# Patient Record
Sex: FEMALE | Race: WHITE | NOT HISPANIC OR LATINO
[De-identification: names, ages, dates, MRNs, and addresses within clinical notes are randomized per-mention and may not be internally consistent; named-entity substitution may affect disease eponyms.]

---

## 2023-04-26 PROBLEM — Z00.00 ENCOUNTER FOR PREVENTIVE HEALTH EXAMINATION: Status: ACTIVE | Noted: 2023-04-26

## 2023-04-27 ENCOUNTER — APPOINTMENT (OUTPATIENT)
Dept: ORTHOPEDIC SURGERY | Facility: CLINIC | Age: 83
End: 2023-04-27
Payer: MEDICARE

## 2023-04-27 VITALS
HEIGHT: 65 IN | SYSTOLIC BLOOD PRESSURE: 148 MMHG | BODY MASS INDEX: 28.66 KG/M2 | DIASTOLIC BLOOD PRESSURE: 75 MMHG | WEIGHT: 172 LBS

## 2023-04-27 DIAGNOSIS — Z78.9 OTHER SPECIFIED HEALTH STATUS: ICD-10-CM

## 2023-04-27 DIAGNOSIS — Z85.828 PERSONAL HISTORY OF OTHER MALIGNANT NEOPLASM OF SKIN: ICD-10-CM

## 2023-04-27 DIAGNOSIS — Z82.69 FAMILY HISTORY OF OTHER DISEASES OF THE MUSCULOSKELETAL SYSTEM AND CONNECTIVE TISSUE: ICD-10-CM

## 2023-04-27 DIAGNOSIS — M16.12 UNILATERAL PRIMARY OSTEOARTHRITIS, LEFT HIP: ICD-10-CM

## 2023-04-27 DIAGNOSIS — Z86.39 PERSONAL HISTORY OF OTHER ENDOCRINE, NUTRITIONAL AND METABOLIC DISEASE: ICD-10-CM

## 2023-04-27 DIAGNOSIS — Z87.39 PERSONAL HISTORY OF OTHER DISEASES OF THE MUSCULOSKELETAL SYSTEM AND CONNECTIVE TISSUE: ICD-10-CM

## 2023-04-27 DIAGNOSIS — Z80.3 FAMILY HISTORY OF MALIGNANT NEOPLASM OF BREAST: ICD-10-CM

## 2023-04-27 DIAGNOSIS — M25.551 PAIN IN RIGHT HIP: ICD-10-CM

## 2023-04-27 DIAGNOSIS — M25.562 PAIN IN LEFT KNEE: ICD-10-CM

## 2023-04-27 PROCEDURE — 73564 X-RAY EXAM KNEE 4 OR MORE: CPT | Mod: LT

## 2023-04-27 PROCEDURE — 99205 OFFICE O/P NEW HI 60 MIN: CPT

## 2023-04-27 PROCEDURE — 73502 X-RAY EXAM HIP UNI 2-3 VIEWS: CPT | Mod: LT

## 2023-04-27 RX ORDER — NYSTATIN 100000 1/G
100000 POWDER TOPICAL
Qty: 1 | Refills: 0 | Status: ACTIVE | COMMUNITY
Start: 2023-04-27 | End: 1900-01-01

## 2023-04-27 RX ORDER — LEVOTHYROXINE SODIUM 0.07 MG/1
75 TABLET ORAL
Refills: 0 | Status: ACTIVE | COMMUNITY

## 2023-04-27 RX ORDER — LOVASTATIN 20 MG/1
20 TABLET ORAL
Refills: 0 | Status: ACTIVE | COMMUNITY

## 2023-04-27 RX ORDER — MELOXICAM 15 MG/1
TABLET ORAL
Refills: 0 | Status: ACTIVE | COMMUNITY

## 2023-04-27 RX ORDER — LEVOTHYROXINE SODIUM 50 UG/1
50 CAPSULE ORAL
Refills: 0 | Status: ACTIVE | COMMUNITY

## 2023-04-27 NOTE — ADDENDUM
[FreeTextEntry1] : This note was authored by Slick Anderson working as a medical scribe for Dr. Aaron Coulter. The note was reviewed, edited, and revised by Dr. Aaron Coulter whom is in agreement with the exam findings, imaging findings, and treatment plan. 04/27/2023

## 2023-04-27 NOTE — PHYSICAL EXAM
[de-identified] : The patient appears well nourished  and in no apparent distress.  The patient is alert and oriented to person, place, and time.   Affect and mood appear normal. The head is normocephalic and atraumatic.  The eyes reveal normal sclera and extra ocular muscles are intact. The tongue is midline with no apparent lesions.  No respiratory distress noted.  Sensation grossly intact.		  [de-identified] : Exam of the left hip shows hip flexion of 60 degrees, hip external rotation of 15 degrees, hip internal rotation of 0 degrees. Exam of the left knee shows no pain with range of motion. \par 5/5 motor strength bilaterally distally. Sensation intact distally.		  [de-identified] : X-ray: AP view of the pelvis and 2 views of the left hip demonstrate bone on bone arthritis.	 \par X-ray: 4 views of the left knee demonstrate well preserved joint spaces except mild patellofemoral arthritis.

## 2023-04-27 NOTE — CONSULT LETTER
[Dear  ___] : Dear  [unfilled], [Consult Letter:] : I had the pleasure of evaluating your patient, [unfilled]. [Please see my note below.] : Please see my note below. [Consult Closing:] : Thank you very much for allowing me to participate in the care of this patient.  If you have any questions, please do not hesitate to contact me. [Sincerely,] : Sincerely, [FreeTextEntry2] : KATELYN WALLER MD\par  [FreeTextEntry3] : Aaron Coulter MD\par Chief of Joint Replacement\par [facility]placement \par [facility]\par \par

## 2023-04-27 NOTE — HISTORY OF PRESENT ILLNESS
[de-identified] : Ms. DAMION HOWELL is a 82 year old female presenting for evaluation of left hip and left knee pain, the hip being the worst of the two. Her hip pain is localized to the left groin and is worse with all weightbearing typically as well as all range of motion of the hip.  She has difficulty walking any distance, standing for long period time, and using stairs.  She has had no injections thus far in the hip.  She has tried physical therapy and anti-inflammatories without significant improvement.  Patient also has left knee pain generalized anteriorly and medially.  This pain is also worse with weightbearing activity.  In the past she has been treated conservatively with gel and cortisone injections.  Her most recent injection overall was gel shot 2 weeks ago without any relief.  Back in October 2022 she had a steroid shot which also only worked temporarily.  She is hopeful to discuss left total hip replacement.

## 2023-04-27 NOTE — DISCUSSION/SUMMARY
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 The patient also presents with left knee pain, however, this pain is likely referred pain coming from her hip. Her left knee range of motion was pain free on exam today and x-rays reveal well preserved left knee joint spaces. Based upon the patient's continued symptoms and failure to respond to conservative treatment, I have recommended a left total hip replacement for this patient. A long discussion took place with the patient describing what a total joint replacement is and what the procedure would entail. A hip model, similar to the implants that will be used during the operation, was utilized to demonstrate the implants. Choices of implant manufactures were discussed and reviewed. The ability to secure the implant utilizing cement or cementless (press fit) fixation was discussed. Anterior and posterior exposures were discussed. For this patient an anterior approach is appropriate. The patient agrees with the plan of care as well as the use of implants.\par \par The hospitalization and post-operative care and rehabilitation were also discussed. The use of perioperative antibiotics and DVT prophylaxis were discussed. The risk, benefits and alternatives to a surgical intervention were discussed at length with the patient. The patient was also advised of risks related to the medical comorbidities and elevated body mass index (BMI). A lengthy discussion took place to review the most common complications including but not limited to: deep vein thrombosis, pulmonary embolus, heart attack, stroke, infection, wound breakdown, numbness, damage to nerves, tendon, muscles, arteries or other blood vessels, death and other possible complications from anesthesia. The patient was told that we will take steps to minimize these risks by using sterile technique, antibiotics and DVT prophylaxis when appropriate and follow the patient postoperatively in the office setting to monitor progress. The possibility of recurrent pain, no improvement in pain and actual worsening of pain were also discussed with the patient.\par \par The discharge plan of care focused on the patient going home following surgery. The patient was encouraged to make the necessary arrangements to have someone stay with them when they are discharged home. Following discharge, a home care nurse will visit the patient. The home care nurse will open your home care case and request home physical therapy services. Home physical therapy will commence following discharge provided it is appropriate and covered by the health insurance benefit plan.\par \par The benefits of surgery were discussed with the patient including the potential for improving the patient's current clinical condition through operative intervention. Alternatives to surgical intervention including continued conservative management were also discussed in detail. All questions were answered to the satisfaction of the patient. The treatment plan of care, as well as a model of a total hip equivalent to the one that will be used for their total joint replacement, was shared with the patient. The patient participated and agreed to the plan of care as well as the use of the recommended implants for their total hip replacement surgery.\par \par We are planning for robotic assistance for the total hip replacement. We discussed that this will require placement of iliac crest pins in the contralateral iliac crest for pelvic bone registration to allow for robotic guidance for the surgery. We will utilize robotic assistance to improve accuracy of the implants, and accurate restoration of both leg lengths and femoral offset.	\par \par The patient will use Nystatin powder under her abdominal crease leading up to surgery to ensure good skin quality near and around the operative site.

## 2023-04-28 ENCOUNTER — APPOINTMENT (OUTPATIENT)
Dept: CT IMAGING | Facility: CLINIC | Age: 83
End: 2023-04-28
Payer: MEDICARE

## 2023-04-28 PROCEDURE — 73700 CT LOWER EXTREMITY W/O DYE: CPT | Mod: LT

## 2023-05-11 ENCOUNTER — OUTPATIENT (OUTPATIENT)
Dept: OUTPATIENT SERVICES | Facility: HOSPITAL | Age: 83
LOS: 1 days | End: 2023-05-11
Payer: MEDICARE

## 2023-05-11 VITALS
OXYGEN SATURATION: 95 % | HEART RATE: 62 BPM | RESPIRATION RATE: 16 BRPM | TEMPERATURE: 98 F | DIASTOLIC BLOOD PRESSURE: 80 MMHG | SYSTOLIC BLOOD PRESSURE: 140 MMHG | WEIGHT: 175.93 LBS | HEIGHT: 64 IN

## 2023-05-11 DIAGNOSIS — M16.11 UNILATERAL PRIMARY OSTEOARTHRITIS, RIGHT HIP: ICD-10-CM

## 2023-05-11 DIAGNOSIS — E03.9 HYPOTHYROIDISM, UNSPECIFIED: ICD-10-CM

## 2023-05-11 DIAGNOSIS — Z01.818 ENCOUNTER FOR OTHER PREPROCEDURAL EXAMINATION: ICD-10-CM

## 2023-05-11 DIAGNOSIS — Z29.9 ENCOUNTER FOR PROPHYLACTIC MEASURES, UNSPECIFIED: ICD-10-CM

## 2023-05-11 DIAGNOSIS — M19.90 UNSPECIFIED OSTEOARTHRITIS, UNSPECIFIED SITE: ICD-10-CM

## 2023-05-11 LAB
A1C WITH ESTIMATED AVERAGE GLUCOSE RESULT: 5.3 % — SIGNIFICANT CHANGE UP (ref 4–5.6)
ANION GAP SERPL CALC-SCNC: 13 MMOL/L — SIGNIFICANT CHANGE UP (ref 5–17)
APTT BLD: 26.4 SEC — LOW (ref 27.5–35.5)
BASOPHILS # BLD AUTO: 0.05 K/UL — SIGNIFICANT CHANGE UP (ref 0–0.2)
BASOPHILS NFR BLD AUTO: 0.9 % — SIGNIFICANT CHANGE UP (ref 0–2)
BLD GP AB SCN SERPL QL: SIGNIFICANT CHANGE UP
BUN SERPL-MCNC: 12.9 MG/DL — SIGNIFICANT CHANGE UP (ref 8–20)
CALCIUM SERPL-MCNC: 9.3 MG/DL — SIGNIFICANT CHANGE UP (ref 8.4–10.5)
CHLORIDE SERPL-SCNC: 103 MMOL/L — SIGNIFICANT CHANGE UP (ref 96–108)
CO2 SERPL-SCNC: 22 MMOL/L — SIGNIFICANT CHANGE UP (ref 22–29)
CREAT SERPL-MCNC: 0.61 MG/DL — SIGNIFICANT CHANGE UP (ref 0.5–1.3)
EGFR: 88 ML/MIN/1.73M2 — SIGNIFICANT CHANGE UP
EOSINOPHIL # BLD AUTO: 0.17 K/UL — SIGNIFICANT CHANGE UP (ref 0–0.5)
EOSINOPHIL NFR BLD AUTO: 3 % — SIGNIFICANT CHANGE UP (ref 0–6)
ESTIMATED AVERAGE GLUCOSE: 105 MG/DL — SIGNIFICANT CHANGE UP (ref 68–114)
GLUCOSE SERPL-MCNC: 101 MG/DL — HIGH (ref 70–99)
HCT VFR BLD CALC: 34.3 % — LOW (ref 34.5–45)
HGB BLD-MCNC: 11.7 G/DL — SIGNIFICANT CHANGE UP (ref 11.5–15.5)
IMM GRANULOCYTES NFR BLD AUTO: 0.4 % — SIGNIFICANT CHANGE UP (ref 0–0.9)
INR BLD: 0.97 RATIO — SIGNIFICANT CHANGE UP (ref 0.88–1.16)
LYMPHOCYTES # BLD AUTO: 1.81 K/UL — SIGNIFICANT CHANGE UP (ref 1–3.3)
LYMPHOCYTES # BLD AUTO: 31.8 % — SIGNIFICANT CHANGE UP (ref 13–44)
MCHC RBC-ENTMCNC: 30.9 PG — SIGNIFICANT CHANGE UP (ref 27–34)
MCHC RBC-ENTMCNC: 34.1 GM/DL — SIGNIFICANT CHANGE UP (ref 32–36)
MCV RBC AUTO: 90.5 FL — SIGNIFICANT CHANGE UP (ref 80–100)
MONOCYTES # BLD AUTO: 0.62 K/UL — SIGNIFICANT CHANGE UP (ref 0–0.9)
MONOCYTES NFR BLD AUTO: 10.9 % — SIGNIFICANT CHANGE UP (ref 2–14)
MRSA PCR RESULT.: SIGNIFICANT CHANGE UP
NEUTROPHILS # BLD AUTO: 3.02 K/UL — SIGNIFICANT CHANGE UP (ref 1.8–7.4)
NEUTROPHILS NFR BLD AUTO: 53 % — SIGNIFICANT CHANGE UP (ref 43–77)
PLATELET # BLD AUTO: 298 K/UL — SIGNIFICANT CHANGE UP (ref 150–400)
POTASSIUM SERPL-MCNC: 4.7 MMOL/L — SIGNIFICANT CHANGE UP (ref 3.5–5.3)
POTASSIUM SERPL-SCNC: 4.7 MMOL/L — SIGNIFICANT CHANGE UP (ref 3.5–5.3)
PROTHROM AB SERPL-ACNC: 11.2 SEC — SIGNIFICANT CHANGE UP (ref 10.5–13.4)
RBC # BLD: 3.79 M/UL — LOW (ref 3.8–5.2)
RBC # FLD: 12.9 % — SIGNIFICANT CHANGE UP (ref 10.3–14.5)
S AUREUS DNA NOSE QL NAA+PROBE: SIGNIFICANT CHANGE UP
SODIUM SERPL-SCNC: 138 MMOL/L — SIGNIFICANT CHANGE UP (ref 135–145)
WBC # BLD: 5.69 K/UL — SIGNIFICANT CHANGE UP (ref 3.8–10.5)
WBC # FLD AUTO: 5.69 K/UL — SIGNIFICANT CHANGE UP (ref 3.8–10.5)

## 2023-05-11 PROCEDURE — 93005 ELECTROCARDIOGRAM TRACING: CPT

## 2023-05-11 PROCEDURE — 93010 ELECTROCARDIOGRAM REPORT: CPT

## 2023-05-11 PROCEDURE — G0463: CPT

## 2023-05-11 RX ORDER — LEVOTHYROXINE SODIUM 125 MCG
1 TABLET ORAL
Refills: 0 | DISCHARGE

## 2023-05-11 NOTE — H&P PST ADULT - NSICDXFAMILYHX_GEN_ALL_CORE_FT
FAMILY HISTORY:  Sibling  Still living? Unknown  FH: breast cancer, Age at diagnosis: Age Unknown    Child  Still living? Unknown  FH: breast cancer, Age at diagnosis: Age Unknown

## 2023-05-11 NOTE — H&P PST ADULT - HISTORY OF PRESENT ILLNESS
82 year old female with pmhx of hypothyroidism, OA  Presents to PST with complaint of left hip pain for the last few years, pain is intermittent, dull and aching, worse with prolonged sitting, stairs, 7/10 in severity   relief with rest, meloxicam, ice. Has tried cortisone and gel  injections, cortisone provided temporary relief, no relief with gel.  Imaging  Patient is scheduled for left total hip replacement with Dr Coulter on 5/24/23.  Medical evaluation pending  82 year old female with pmhx of hypothyroidism, OA. Presents to [facility] with complaint of left hip pain for the last few years, pain is intermittent, dull and aching, worse with prolonged sitting, stairs, 7/10 in severity, relief with rest, meloxicam, ice. Has tried cortisone and gel  injections, cortisone provided temporary relief, no relief with gel. Ct of left hip revealed Severe left hip arthrosis without effusion. Patient is scheduled for left total hip replacement with Dr Coulter on 5/24/23.  Medical evaluation pending

## 2023-05-11 NOTE — H&P PST ADULT - MUSCULOSKELETAL COMMENTS
left hip and left knee left hip skin intact on breakdown or redness, dec ROM due to pain, left knee tenderness dec rom due to pain

## 2023-05-11 NOTE — H&P PST ADULT - RESPIRATORY
21 Yo college student, occasional drinker, non smoker. denies any recreational drug use.
normal/clear to auscultation bilaterally/no wheezes/no rales/no rhonchi

## 2023-05-11 NOTE — H&P PST ADULT - NSANTHOSAYNRD_GEN_A_CORE
No. LV screening performed.  STOP BANG Legend: 0-2 = LOW Risk; 3-4 = INTERMEDIATE Risk; 5-8 = HIGH Risk

## 2023-05-11 NOTE — H&P PST ADULT - NSICDXPASTSURGICALHX_GEN_ALL_CORE_FT
PAST SURGICAL HISTORY:  History of appendectomy     History of tonsillectomy     S/P cataract surgery

## 2023-05-11 NOTE — H&P PST ADULT - MUSCULOSKELETAL
details… normal/no calf tenderness/decreased ROM due to pain/strength 5/5 bilateral upper extremities/strength 5/5 bilateral lower extremities/abnormal gait

## 2023-05-11 NOTE — H&P PST ADULT - ASSESSMENT
CAPRINI SCORE    AGE RELATED RISK FACTORS                                                             [ ] Age 41-60 years                                            (1 Point)  [ ] Age: 61-74 years                                           (2 Points)                 [ x] Age= 75 years                                                (3 Points)             DISEASE RELATED RISK FACTORS                                                       [ x] Edema in the lower extremities                 (1 Point)                     [ ] Varicose veins                                               (1 Point)                                 [x ] BMI > 25 Kg/m2                                            (1 Point)                                  [ ] Serious infection (ie PNA)                            (1 Point)                     [ ] Lung disease ( COPD, Emphysema)            (1 Point)                                                                          [ ] Acute myocardial infarction                         (1 Point)                  [ ] Congestive heart failure (in the previous month)  (1 Point)         [ ] Inflammatory bowel disease                            (1 Point)                  [ ] Central venous access, PICC or Port               (2 points)       (within the last month)                                                                [ ] Stroke (in the previous month)                        (5 Points)    [ ] Previous or present malignancy                       (2 points)                                                                                                                                                         HEMATOLOGY RELATED FACTORS                                                         [ ] Prior episodes of VTE                                     (3 Points)                     [ ] Positive family history for VTE                      (3 Points)                  [ ] Prothrombin 35224 A                                     (3 Points)                     [ ] Factor V Leiden                                                (3 Points)                        [ ] Lupus anticoagulants                                      (3 Points)                                                           [ ] Anticardiolipin antibodies                              (3 Points)                                                       [ ] High homocysteine in the blood                   (3 Points)                                             [ ] Other congenital or acquired thrombophilia      (3 Points)                                                [ ] Heparin induced thrombocytopenia                  (3 Points)                                        MOBILITY RELATED FACTORS  [ ] Bed rest                                                         (1 Point)  [ ] Plaster cast                                                    (2 points)  [ ] Bed bound for more than 72 hours           (2 Points)    GENDER SPECIFIC FACTORS  [ ] Pregnancy or had a baby within the last month   (1 Point)  [ ] Post-partum < 6 weeks                                   (1 Point)  [ ] Hormonal therapy  or oral contraception   (1 Point)  [ ] History of pregnancy complications              (1 point)  [ ] Unexplained or recurrent              (1 Point)    OTHER RISK FACTORS                                           (1 Point)  [ ] BMI >40, smoking, diabetes requiring insulin, chemotherapy  blood transfusions and length of surgery over 2 hours    SURGERY RELATED RISK FACTORS  [ ]  Section within the last month     (1 Point)  [ ] Minor surgery                                                  (1 Point)  [ ] Arthroscopic surgery                                       (2 Points)  [ ] Planned major surgery lasting more            (2 Points)      than 45 minutes     [x ] Elective hip or knee joint replacement       (5 points)       surgery                                                TRAUMA RELATED RISK FACTORS  [ ] Fracture of the hip, pelvis, or leg                       (5 Points)  [ ] Spinal cord injury resulting in paralysis             (5 points)       (in the previous month)    [ ] Paralysis  (less than 1 month)                             (5 Points)  [ ] Multiple Trauma within 1 month                        (5 Points)    Total Score [   10     ]    Caprini Score 0-2: Low Risk, NO VTE prophylaxis required for most patients, encourage ambulation  Caprini Score 3-6: Moderate Risk , pharmacologic VTE prophylaxis is indicated for most patients (in the absence of contraindications)  Caprini Score Greater than or =7: High risk, pharmocologic VTE prophylaxis indicated for most patients (in the absence of contraindications)      OPIOID RISK TOOL    STEVE EACH BOX THAT APPLIES AND ADD TOTALS AT THE END    FAMILY HISTORY OF SUBSTANCE ABUSE                 FEMALE         MALE                                                Alcohol                             [  ]1 pt          [  ]3pts                                               Illegal Durgs                     [  ]2 pts        [  ]3pts                                               Rx Drugs                           [  ]4 pts        [  ]4 pts    PERSONAL HISTORY OF SUBSTANCE ABUSE                                                                                          Alcohol                             [  ]3 pts       [  ]3 pts                                               Illegal Durgs                     [  ]4 pts        [  ]4 pts                                               Rx Drugs                           [  ]5 pts        [  ]5 pts    AGE BETWEEN 16-45 YEARS                                      [  ]1 pt         [  ]1 pt    HISTORY OF PREADOLESCENT   SEXUAL ABUSE                                                             [  ]3 pts        [  ]0pts    PSYCHOLOGICAL DISEASE                     ADD, OCD, Bipolar, Schizophrenia        [  ]2 pts         [  ]2 pts                      Depression                                               [  ]1 pt           [  ]1 pt           SCORING TOTAL   0                                   A score of 3 or lower indicated LOW risk for future opiod abuse  A score of 4 to 7 indicated moderate risk for future opiod abuse  A score of 8 or higher indicates a high risk for opiod abuse      82 year old female with pmhx of hypothyroidism, OA. Presents to PST with complaint of left hip pain for the last few years, pain is intermittent, dull and aching, worse with prolonged sitting, stairs, 7/10 in severity, relief with rest, meloxicam, ice. Has tried cortisone and gel  injections, cortisone provided temporary relief, no relief with gel. Ct of left hip revealed Severe left hip arthrosis without effusion. Patient is scheduled for left total hip replacement with Dr Coulter on 23. Patient educated on surgical scrub, preadmission instructions, medical clearance and day of procedure medications, verbalizes understanding. Pt instructed to stop vitamins/supplements/herbal medications/ASA/NSAIDS for one week prior to surgery and discuss with PMD.          CAPRINI SCORE    AGE RELATED RISK FACTORS                                                             [ ] Age 41-60 years                                            (1 Point)  [ ] Age: 61-74 years                                           (2 Points)                 [ x] Age= 75 years                                                (3 Points)             DISEASE RELATED RISK FACTORS                                                       [ x] Edema in the lower extremities                 (1 Point)                     [ ] Varicose veins                                               (1 Point)                                 [x ] BMI > 25 Kg/m2                                            (1 Point)                                  [ ] Serious infection (ie PNA)                            (1 Point)                     [ ] Lung disease ( COPD, Emphysema)            (1 Point)                                                                          [ ] Acute myocardial infarction                         (1 Point)                  [ ] Congestive heart failure (in the previous month)  (1 Point)         [ ] Inflammatory bowel disease                            (1 Point)                  [ ] Central venous access, PICC or Port               (2 points)       (within the last month)                                                                [ ] Stroke (in the previous month)                        (5 Points)    [ ] Previous or present malignancy                       (2 points)                                                                                                                                                         HEMATOLOGY RELATED FACTORS                                                         [ ] Prior episodes of VTE                                     (3 Points)                     [ ] Positive family history for VTE                      (3 Points)                  [ ] Prothrombin 76988 A                                     (3 Points)                     [ ] Factor V Leiden                                                (3 Points)                        [ ] Lupus anticoagulants                                      (3 Points)                                                           [ ] Anticardiolipin antibodies                              (3 Points)                                                       [ ] High homocysteine in the blood                   (3 Points)                                             [ ] Other congenital or acquired thrombophilia      (3 Points)                                                [ ] Heparin induced thrombocytopenia                  (3 Points)                                        MOBILITY RELATED FACTORS  [ ] Bed rest                                                         (1 Point)  [ ] Plaster cast                                                    (2 points)  [ ] Bed bound for more than 72 hours           (2 Points)    GENDER SPECIFIC FACTORS  [ ] Pregnancy or had a baby within the last month   (1 Point)  [ ] Post-partum < 6 weeks                                   (1 Point)  [ ] Hormonal therapy  or oral contraception   (1 Point)  [ ] History of pregnancy complications              (1 point)  [ ] Unexplained or recurrent              (1 Point)    OTHER RISK FACTORS                                           (1 Point)  [ ] BMI >40, smoking, diabetes requiring insulin, chemotherapy  blood transfusions and length of surgery over 2 hours    SURGERY RELATED RISK FACTORS  [ ]  Section within the last month     (1 Point)  [ ] Minor surgery                                                  (1 Point)  [ ] Arthroscopic surgery                                       (2 Points)  [ ] Planned major surgery lasting more            (2 Points)      than 45 minutes     [x ] Elective hip or knee joint replacement       (5 points)       surgery                                                TRAUMA RELATED RISK FACTORS  [ ] Fracture of the hip, pelvis, or leg                       (5 Points)  [ ] Spinal cord injury resulting in paralysis             (5 points)       (in the previous month)    [ ] Paralysis  (less than 1 month)                             (5 Points)  [ ] Multiple Trauma within 1 month                        (5 Points)    Total Score [   10     ]    Caprini Score 0-2: Low Risk, NO VTE prophylaxis required for most patients, encourage ambulation  Caprini Score 3-6: Moderate Risk , pharmacologic VTE prophylaxis is indicated for most patients (in the absence of contraindications)  Caprini Score Greater than or =7: High risk, pharmocologic VTE prophylaxis indicated for most patients (in the absence of contraindications)      OPIOID RISK TOOL    STEVE EACH BOX THAT APPLIES AND ADD TOTALS AT THE END    FAMILY HISTORY OF SUBSTANCE ABUSE                 FEMALE         MALE                                                Alcohol                             [  ]1 pt          [  ]3pts                                               Illegal Durgs                     [  ]2 pts        [  ]3pts                                               Rx Drugs                           [  ]4 pts        [  ]4 pts    PERSONAL HISTORY OF SUBSTANCE ABUSE                                                                                          Alcohol                             [  ]3 pts       [  ]3 pts                                               Illegal Durgs                     [  ]4 pts        [  ]4 pts                                               Rx Drugs                           [  ]5 pts        [  ]5 pts    AGE BETWEEN 16-45 YEARS                                      [  ]1 pt         [  ]1 pt    HISTORY OF PREADOLESCENT   SEXUAL ABUSE                                                             [  ]3 pts        [  ]0pts    PSYCHOLOGICAL DISEASE                     ADD, OCD, Bipolar, Schizophrenia        [  ]2 pts         [  ]2 pts                      Depression                                               [  ]1 pt           [  ]1 pt           SCORING TOTAL   0                                   A score of 3 or lower indicated LOW risk for future opiod abuse  A score of 4 to 7 indicated moderate risk for future opiod abuse  A score of 8 or higher indicates a high risk for opiod abuse      82 year old female with pmhx of hypothyroidism, OA. Presents to PST with complaint of left hip pain for the last few years, pain is intermittent, dull and aching, worse with prolonged sitting, stairs, 7/10 in severity, relief with rest, meloxicam, ice. Has tried cortisone and gel  injections, cortisone provided temporary relief, no relief with gel. Ct of left hip revealed Severe left hip arthrosis without effusion. Patient is scheduled for left total hip replacement with Dr Coulter on 23. Patient educated on surgical scrub, preadmission instructions, medical clearance and day of procedure medications, verbalizes understanding. Pt instructed to stop vitamins/supplements/herbal medications/ASA/NSAIDS for one week prior to surgery and discuss with PMD. Patient was given information on joint class, joint book provided, ERP protocol reviewed with patient, MSSA/MRSA swabbed in PST, results pending

## 2023-05-23 ENCOUNTER — TRANSCRIPTION ENCOUNTER (OUTPATIENT)
Age: 83
End: 2023-05-23

## 2023-05-24 ENCOUNTER — TRANSCRIPTION ENCOUNTER (OUTPATIENT)
Age: 83
End: 2023-05-24

## 2023-05-24 ENCOUNTER — OUTPATIENT (OUTPATIENT)
Dept: OUTPATIENT SERVICES | Facility: HOSPITAL | Age: 83
LOS: 1 days | End: 2023-05-24
Payer: MEDICARE

## 2023-05-24 ENCOUNTER — APPOINTMENT (OUTPATIENT)
Dept: ORTHOPEDIC SURGERY | Facility: HOSPITAL | Age: 83
End: 2023-05-24

## 2023-05-24 PROCEDURE — 27130 TOTAL HIP ARTHROPLASTY: CPT | Mod: AS,LT

## 2023-05-24 RX ORDER — UBIDECARENONE 100 MG
1 CAPSULE ORAL
Refills: 0 | DISCHARGE

## 2023-05-24 RX ORDER — LEVOTHYROXINE SODIUM 125 MCG
1 TABLET ORAL
Refills: 0 | DISCHARGE

## 2023-05-24 RX ORDER — GINKGO BILOBA 40 MG
1 CAPSULE ORAL
Refills: 0 | DISCHARGE

## 2023-05-24 RX ORDER — LOVASTATIN 20 MG
1 TABLET ORAL
Refills: 0 | DISCHARGE

## 2023-05-25 ENCOUNTER — TRANSCRIPTION ENCOUNTER (OUTPATIENT)
Age: 83
End: 2023-05-25

## 2023-05-25 PROCEDURE — 82962 GLUCOSE BLOOD TEST: CPT

## 2023-05-25 PROCEDURE — 27130 TOTAL HIP ARTHROPLASTY: CPT | Mod: LT

## 2023-05-25 PROCEDURE — 73501 X-RAY EXAM HIP UNI 1 VIEW: CPT

## 2023-05-25 PROCEDURE — C1713: CPT

## 2023-05-25 PROCEDURE — 85027 COMPLETE CBC AUTOMATED: CPT

## 2023-05-25 PROCEDURE — 0055T BONE SRGRY CMPTR CT/MRI IMAG: CPT | Mod: LT

## 2023-05-25 PROCEDURE — C1889: CPT

## 2023-05-25 PROCEDURE — 36415 COLL VENOUS BLD VENIPUNCTURE: CPT

## 2023-05-25 PROCEDURE — C1776: CPT

## 2023-05-25 PROCEDURE — S2900: CPT

## 2023-05-25 RX ORDER — MELOXICAM 15 MG/1
1 TABLET ORAL
Refills: 0 | DISCHARGE

## 2023-05-26 ENCOUNTER — TRANSCRIPTION ENCOUNTER (OUTPATIENT)
Age: 83
End: 2023-05-26

## 2023-05-28 ENCOUNTER — TRANSCRIPTION ENCOUNTER (OUTPATIENT)
Age: 83
End: 2023-05-28

## 2023-05-28 ENCOUNTER — INPATIENT (INPATIENT)
Facility: HOSPITAL | Age: 83
LOS: 1 days | Discharge: HOME | DRG: 392 | End: 2023-05-30
Attending: INTERNAL MEDICINE | Admitting: STUDENT IN AN ORGANIZED HEALTH CARE EDUCATION/TRAINING PROGRAM
Payer: MEDICARE

## 2023-05-28 VITALS
TEMPERATURE: 98 F | DIASTOLIC BLOOD PRESSURE: 59 MMHG | RESPIRATION RATE: 20 BRPM | SYSTOLIC BLOOD PRESSURE: 140 MMHG | OXYGEN SATURATION: 99 % | WEIGHT: 173.94 LBS | HEIGHT: 65 IN | HEART RATE: 57 BPM

## 2023-05-28 DIAGNOSIS — K52.9 NONINFECTIVE GASTROENTERITIS AND COLITIS, UNSPECIFIED: ICD-10-CM

## 2023-05-28 DIAGNOSIS — Z29.9 ENCOUNTER FOR PROPHYLACTIC MEASURES, UNSPECIFIED: ICD-10-CM

## 2023-05-28 DIAGNOSIS — Z90.49 ACQUIRED ABSENCE OF OTHER SPECIFIED PARTS OF DIGESTIVE TRACT: Chronic | ICD-10-CM

## 2023-05-28 DIAGNOSIS — Z96.642 PRESENCE OF LEFT ARTIFICIAL HIP JOINT: ICD-10-CM

## 2023-05-28 DIAGNOSIS — R10.9 UNSPECIFIED ABDOMINAL PAIN: ICD-10-CM

## 2023-05-28 DIAGNOSIS — Z98.49 CATARACT EXTRACTION STATUS, UNSPECIFIED EYE: Chronic | ICD-10-CM

## 2023-05-28 DIAGNOSIS — Z96.642 PRESENCE OF LEFT ARTIFICIAL HIP JOINT: Chronic | ICD-10-CM

## 2023-05-28 DIAGNOSIS — E03.9 HYPOTHYROIDISM, UNSPECIFIED: ICD-10-CM

## 2023-05-28 LAB
ALBUMIN SERPL ELPH-MCNC: 3.3 G/DL — SIGNIFICANT CHANGE UP (ref 3.3–5)
ALP SERPL-CCNC: 100 U/L — SIGNIFICANT CHANGE UP (ref 40–120)
ALT FLD-CCNC: 24 U/L — SIGNIFICANT CHANGE UP (ref 12–78)
ANION GAP SERPL CALC-SCNC: 9 MMOL/L — SIGNIFICANT CHANGE UP (ref 5–17)
APPEARANCE UR: CLEAR — SIGNIFICANT CHANGE UP
AST SERPL-CCNC: 28 U/L — SIGNIFICANT CHANGE UP (ref 15–37)
BACTERIA # UR AUTO: ABNORMAL
BASOPHILS # BLD AUTO: 0.02 K/UL — SIGNIFICANT CHANGE UP (ref 0–0.2)
BASOPHILS NFR BLD AUTO: 0.2 % — SIGNIFICANT CHANGE UP (ref 0–2)
BILIRUB SERPL-MCNC: 1.3 MG/DL — HIGH (ref 0.2–1.2)
BILIRUB UR-MCNC: NEGATIVE — SIGNIFICANT CHANGE UP
BUN SERPL-MCNC: 9 MG/DL — SIGNIFICANT CHANGE UP (ref 7–23)
CALCIUM SERPL-MCNC: 8.8 MG/DL — SIGNIFICANT CHANGE UP (ref 8.5–10.1)
CHLORIDE SERPL-SCNC: 104 MMOL/L — SIGNIFICANT CHANGE UP (ref 96–108)
CO2 SERPL-SCNC: 25 MMOL/L — SIGNIFICANT CHANGE UP (ref 22–31)
COLOR SPEC: YELLOW — SIGNIFICANT CHANGE UP
CREAT SERPL-MCNC: 0.55 MG/DL — SIGNIFICANT CHANGE UP (ref 0.5–1.3)
DIFF PNL FLD: ABNORMAL
EGFR: 90 ML/MIN/1.73M2 — SIGNIFICANT CHANGE UP
EOSINOPHIL # BLD AUTO: 0.02 K/UL — SIGNIFICANT CHANGE UP (ref 0–0.5)
EOSINOPHIL NFR BLD AUTO: 0.2 % — SIGNIFICANT CHANGE UP (ref 0–6)
EPI CELLS # UR: SIGNIFICANT CHANGE UP
GLUCOSE SERPL-MCNC: 132 MG/DL — HIGH (ref 70–99)
GLUCOSE UR QL: NEGATIVE — SIGNIFICANT CHANGE UP
HCT VFR BLD CALC: 31.1 % — LOW (ref 34.5–45)
HGB BLD-MCNC: 10.7 G/DL — LOW (ref 11.5–15.5)
IMM GRANULOCYTES NFR BLD AUTO: 0.7 % — SIGNIFICANT CHANGE UP (ref 0–0.9)
KETONES UR-MCNC: ABNORMAL
LACTATE SERPL-SCNC: 0.9 MMOL/L — SIGNIFICANT CHANGE UP (ref 0.7–2)
LEUKOCYTE ESTERASE UR-ACNC: ABNORMAL
LIDOCAIN IGE QN: 57 U/L — LOW (ref 73–393)
LYMPHOCYTES # BLD AUTO: 0.52 K/UL — LOW (ref 1–3.3)
LYMPHOCYTES # BLD AUTO: 5.6 % — LOW (ref 13–44)
MCHC RBC-ENTMCNC: 31.5 PG — SIGNIFICANT CHANGE UP (ref 27–34)
MCHC RBC-ENTMCNC: 34.4 GM/DL — SIGNIFICANT CHANGE UP (ref 32–36)
MCV RBC AUTO: 91.5 FL — SIGNIFICANT CHANGE UP (ref 80–100)
MONOCYTES # BLD AUTO: 0.57 K/UL — SIGNIFICANT CHANGE UP (ref 0–0.9)
MONOCYTES NFR BLD AUTO: 6.2 % — SIGNIFICANT CHANGE UP (ref 2–14)
NEUTROPHILS # BLD AUTO: 8.03 K/UL — HIGH (ref 1.8–7.4)
NEUTROPHILS NFR BLD AUTO: 87.1 % — HIGH (ref 43–77)
NITRITE UR-MCNC: NEGATIVE — SIGNIFICANT CHANGE UP
NRBC # BLD: 0 /100 WBCS — SIGNIFICANT CHANGE UP (ref 0–0)
PH UR: 5 — SIGNIFICANT CHANGE UP (ref 5–8)
PLATELET # BLD AUTO: 309 K/UL — SIGNIFICANT CHANGE UP (ref 150–400)
POTASSIUM SERPL-MCNC: 3.8 MMOL/L — SIGNIFICANT CHANGE UP (ref 3.5–5.3)
POTASSIUM SERPL-SCNC: 3.8 MMOL/L — SIGNIFICANT CHANGE UP (ref 3.5–5.3)
PROT SERPL-MCNC: 6.9 G/DL — SIGNIFICANT CHANGE UP (ref 6–8.3)
PROT UR-MCNC: NEGATIVE — SIGNIFICANT CHANGE UP
RBC # BLD: 3.4 M/UL — LOW (ref 3.8–5.2)
RBC # FLD: 13.2 % — SIGNIFICANT CHANGE UP (ref 10.3–14.5)
RBC CASTS # UR COMP ASSIST: ABNORMAL /HPF (ref 0–4)
SODIUM SERPL-SCNC: 138 MMOL/L — SIGNIFICANT CHANGE UP (ref 135–145)
SP GR SPEC: 1.02 — SIGNIFICANT CHANGE UP (ref 1.01–1.02)
UROBILINOGEN FLD QL: 4
WBC # BLD: 9.22 K/UL — SIGNIFICANT CHANGE UP (ref 3.8–10.5)
WBC # FLD AUTO: 9.22 K/UL — SIGNIFICANT CHANGE UP (ref 3.8–10.5)
WBC UR QL: SIGNIFICANT CHANGE UP

## 2023-05-28 PROCEDURE — 74177 CT ABD & PELVIS W/CONTRAST: CPT | Mod: 26,MA

## 2023-05-28 PROCEDURE — 99223 1ST HOSP IP/OBS HIGH 75: CPT

## 2023-05-28 PROCEDURE — 93010 ELECTROCARDIOGRAM REPORT: CPT

## 2023-05-28 PROCEDURE — 71045 X-RAY EXAM CHEST 1 VIEW: CPT | Mod: 26

## 2023-05-28 PROCEDURE — 99285 EMERGENCY DEPT VISIT HI MDM: CPT

## 2023-05-28 RX ORDER — SENNA PLUS 8.6 MG/1
2 TABLET ORAL AT BEDTIME
Refills: 0 | Status: DISCONTINUED | OUTPATIENT
Start: 2023-05-28 | End: 2023-05-29

## 2023-05-28 RX ORDER — SODIUM CHLORIDE 9 MG/ML
1000 INJECTION INTRAMUSCULAR; INTRAVENOUS; SUBCUTANEOUS ONCE
Refills: 0 | Status: COMPLETED | OUTPATIENT
Start: 2023-05-28 | End: 2023-05-28

## 2023-05-28 RX ORDER — ONDANSETRON 8 MG/1
4 TABLET, FILM COATED ORAL ONCE
Refills: 0 | Status: COMPLETED | OUTPATIENT
Start: 2023-05-28 | End: 2023-05-28

## 2023-05-28 RX ORDER — ACETAMINOPHEN 500 MG
1000 TABLET ORAL ONCE
Refills: 0 | Status: COMPLETED | OUTPATIENT
Start: 2023-05-28 | End: 2023-05-28

## 2023-05-28 RX ORDER — ENOXAPARIN SODIUM 100 MG/ML
40 INJECTION SUBCUTANEOUS EVERY 24 HOURS
Refills: 0 | Status: DISCONTINUED | OUTPATIENT
Start: 2023-05-28 | End: 2023-05-30

## 2023-05-28 RX ORDER — LEVOTHYROXINE SODIUM 125 MCG
75 TABLET ORAL
Refills: 0 | Status: DISCONTINUED | OUTPATIENT
Start: 2023-05-28 | End: 2023-05-30

## 2023-05-28 RX ORDER — SODIUM CHLORIDE 9 MG/ML
1000 INJECTION, SOLUTION INTRAVENOUS
Refills: 0 | Status: DISCONTINUED | OUTPATIENT
Start: 2023-05-28 | End: 2023-05-29

## 2023-05-28 RX ORDER — CELECOXIB 200 MG/1
200 CAPSULE ORAL
Refills: 0 | Status: DISCONTINUED | OUTPATIENT
Start: 2023-05-28 | End: 2023-05-30

## 2023-05-28 RX ORDER — METRONIDAZOLE 500 MG
500 TABLET ORAL ONCE
Refills: 0 | Status: COMPLETED | OUTPATIENT
Start: 2023-05-28 | End: 2023-05-28

## 2023-05-28 RX ORDER — POLYETHYLENE GLYCOL 3350 17 G/17G
17 POWDER, FOR SOLUTION ORAL
Refills: 0 | Status: DISCONTINUED | OUTPATIENT
Start: 2023-05-28 | End: 2023-05-29

## 2023-05-28 RX ORDER — LEVOTHYROXINE SODIUM 125 MCG
50 TABLET ORAL
Refills: 0 | Status: DISCONTINUED | OUTPATIENT
Start: 2023-05-28 | End: 2023-05-30

## 2023-05-28 RX ORDER — CIPROFLOXACIN LACTATE 400MG/40ML
400 VIAL (ML) INTRAVENOUS ONCE
Refills: 0 | Status: COMPLETED | OUTPATIENT
Start: 2023-05-28 | End: 2023-05-28

## 2023-05-28 RX ORDER — PANTOPRAZOLE SODIUM 20 MG/1
40 TABLET, DELAYED RELEASE ORAL DAILY
Refills: 0 | Status: DISCONTINUED | OUTPATIENT
Start: 2023-05-28 | End: 2023-05-30

## 2023-05-28 RX ORDER — LANOLIN ALCOHOL/MO/W.PET/CERES
3 CREAM (GRAM) TOPICAL AT BEDTIME
Refills: 0 | Status: DISCONTINUED | OUTPATIENT
Start: 2023-05-28 | End: 2023-05-30

## 2023-05-28 RX ORDER — ACETAMINOPHEN 500 MG
650 TABLET ORAL EVERY 6 HOURS
Refills: 0 | Status: DISCONTINUED | OUTPATIENT
Start: 2023-05-28 | End: 2023-05-30

## 2023-05-28 RX ORDER — IOHEXOL 300 MG/ML
30 INJECTION, SOLUTION INTRAVENOUS ONCE
Refills: 0 | Status: COMPLETED | OUTPATIENT
Start: 2023-05-28 | End: 2023-05-28

## 2023-05-28 RX ORDER — ONDANSETRON 8 MG/1
4 TABLET, FILM COATED ORAL EVERY 8 HOURS
Refills: 0 | Status: DISCONTINUED | OUTPATIENT
Start: 2023-05-28 | End: 2023-05-30

## 2023-05-28 RX ORDER — ATORVASTATIN CALCIUM 80 MG/1
10 TABLET, FILM COATED ORAL AT BEDTIME
Refills: 0 | Status: DISCONTINUED | OUTPATIENT
Start: 2023-05-28 | End: 2023-05-30

## 2023-05-28 RX ADMIN — ENOXAPARIN SODIUM 40 MILLIGRAM(S): 100 INJECTION SUBCUTANEOUS at 22:24

## 2023-05-28 RX ADMIN — Medication 400 MILLIGRAM(S): at 22:04

## 2023-05-28 RX ADMIN — Medication 100 MILLIGRAM(S): at 20:58

## 2023-05-28 RX ADMIN — ONDANSETRON 4 MILLIGRAM(S): 8 TABLET, FILM COATED ORAL at 18:07

## 2023-05-28 RX ADMIN — IOHEXOL 30 MILLILITER(S): 300 INJECTION, SOLUTION INTRAVENOUS at 18:08

## 2023-05-28 RX ADMIN — SENNA PLUS 2 TABLET(S): 8.6 TABLET ORAL at 22:23

## 2023-05-28 RX ADMIN — POLYETHYLENE GLYCOL 3350 17 GRAM(S): 17 POWDER, FOR SOLUTION ORAL at 22:24

## 2023-05-28 RX ADMIN — ONDANSETRON 4 MILLIGRAM(S): 8 TABLET, FILM COATED ORAL at 20:02

## 2023-05-28 RX ADMIN — ATORVASTATIN CALCIUM 10 MILLIGRAM(S): 80 TABLET, FILM COATED ORAL at 22:23

## 2023-05-28 RX ADMIN — SODIUM CHLORIDE 1000 MILLILITER(S): 9 INJECTION INTRAMUSCULAR; INTRAVENOUS; SUBCUTANEOUS at 18:08

## 2023-05-28 RX ADMIN — SODIUM CHLORIDE 1000 MILLILITER(S): 9 INJECTION INTRAMUSCULAR; INTRAVENOUS; SUBCUTANEOUS at 20:58

## 2023-05-28 RX ADMIN — Medication 1000 MILLIGRAM(S): at 18:37

## 2023-05-28 RX ADMIN — SODIUM CHLORIDE 75 MILLILITER(S): 9 INJECTION, SOLUTION INTRAVENOUS at 22:20

## 2023-05-28 RX ADMIN — Medication 400 MILLIGRAM(S): at 18:26

## 2023-05-28 RX ADMIN — Medication 200 MILLIGRAM(S): at 22:21

## 2023-05-28 NOTE — H&P ADULT - NSICDXFAMILYHX_GEN_ALL_CORE_FT
FAMILY HISTORY:  Sibling  Still living? Unknown  FHx: breast cancer, Age at diagnosis: Age Unknown    Child  Still living? Unknown  FHx: breast cancer, Age at diagnosis: Age Unknown

## 2023-05-28 NOTE — ED ADULT NURSE REASSESSMENT NOTE - ED CARDIAC CAPILLARY REFILL
Brief description of triage: patient received 2nd COVID vaccine yesterday, reports chills, arm to injection site, fever 101, and fatigue    Triage indicates for patient to be provided with home care advice     Care advice provided, patient verbalizes understanding; denies any other questions or concerns; instructed to call back for any new or worsening symptoms. This triage is a result of a call to [facility] Nurse. Please do not respond to the triage nurse through this encounter. Any subsequent communication should be directly with the patient. Reason for Disposition   COVID-19 vaccine, systemic reactions (e.g., fatigue, fever, muscle aches), questions about    Answer Assessment - Initial Assessment Questions  1. MAIN CONCERN OR SYMPTOM:  \"What is your main concern right now? \" \"What question do you have? \" \"What's the main symptom you're worried about? \" (e.g., fever, pain, redness, swelling)      Fever 101, chills, soreness to arm where injection was given, fatigue    2. VACCINE: \"What vaccination did you receive? \" \"Is this your first or second shot? \" (e.g., none; Berto Ohm, other)      2nd vaccine     3. SYMPTOM ONSET: \"When did the symptoms begin? \" (e.g., not relevant; hours, days)       Yesterday     4. SYMPTOM SEVERITY: \"How bad is it? \"      Patient is awake and alert, feels tired     5. FEVER: \"Is there a fever? \" If so, ask: \"What is it, how was it measured, and when did it start? \"       Yes, 101    6. PAST REACTIONS: \"Have you reacted to immunizations before? \" If so, ask: \"What happened?\"        7. OTHER SYMPTOMS: \"Do you have any other symptoms? \"      Fatigue, chills fever    Protocols used: CORONAVIRUS (COVID-19) VACCINE QUESTIONS AND REACTIONS-ADULT-OH 2 seconds or less

## 2023-05-28 NOTE — H&P ADULT - NSHPPHYSICALEXAM_GEN_ALL_CORE
T(C): 36.7 (05-28-23 @ 19:47), Max: 36.9 (05-28-23 @ 17:22)  HR: 60 (05-28-23 @ 19:47) (57 - 60)  BP: 155/74 (05-28-23 @ 19:47) (140/59 - 155/74)  RR: 18 (05-28-23 @ 19:47) (18 - 20)  SpO2: 99% (05-28-23 @ 19:47) (99% - 99%)    General: No apparent distress  Head: normocephalic, atraumatic  Eyes: EOMI, anicteric  ENT: moist mucous membranes, no pharyngeal exudates  Heart: rrr, S1, S2, no murmurs  Chest: CTA b/l, no rales, rhonchi, or wheezes  Abd: BS+, soft, NT, ND  Back: no CVA tenderness  Extr: no edema or cyanosis  Neuro: AA&Ox3, no focal weakness, sensation to light touch intact  Psych: normal affect T(C): 36.7 (05-28-23 @ 19:47), Max: 36.9 (05-28-23 @ 17:22)  HR: 60 (05-28-23 @ 19:47) (57 - 60)  BP: 155/74 (05-28-23 @ 19:47) (140/59 - 155/74)  RR: 18 (05-28-23 @ 19:47) (18 - 20)  SpO2: 99% (05-28-23 @ 19:47) (99% - 99%)    General: No apparent distress  Head: normocephalic, atraumatic  Eyes: EOMI, anicteric  ENT: moist mucous membranes, no pharyngeal exudates  Heart: rrr, S1, S2, no murmurs  Chest: CTA b/l, no rales, rhonchi, or wheezes  Abd: BS+, soft, tender to palpation in lower abdomen, ND  Back: no CVA tenderness  Extr: no edema or cyanosis; left hip incision c/d/i  Neuro: AA&Ox3, no focal weakness, sensation to light touch intact  Psych: normal affect

## 2023-05-28 NOTE — ED PROVIDER NOTE - OBJECTIVE STATEMENT
82-year-old female 6 days status post left hip replacement elective, presents with constipation and abdominal pain.  Patient states her last bowel movement was Wednesday before her surgery.  After surgery while she was in the hospital she was receiving OxyContin.  Patient did not take any oxycodone since discharge.  Has been taking senna at night.  Today developed abdominal pain and called the nurses hotline and was told to take MiraLAX and milk of magnesia.  Patient did this and she still did not have another bowel movement.  Patient pain is progressing and then had episode of vomiting.  Now presents to the ER with progressive pain and nausea.  Patient states today she was not able to pass any gas.  Denies fever chills or urinary symptoms.  .  Patient has history of appendectomy no other abdominal surgeries

## 2023-05-28 NOTE — ED ADULT NURSE NOTE - NSFALLRISKINTERV_ED_ALL_ED

## 2023-05-28 NOTE — ED ADULT NURSE NOTE - OBJECTIVE STATEMENT
82yr old female a&ox4 arrives to ED from home notes to have had left hip surgery several days ago. pt notes ambulation with walker, denies pain to hip, denies fever chills, sob or chest pain, pt notes n/v abd pain, admits to no BM since hip surgery, denies narcotic use for pain. last dose of Tylenol 0700 today. respiration even and unlabored. Ritika DE LA O

## 2023-05-28 NOTE — PATIENT PROFILE ADULT - FUNCTIONAL ASSESSMENT - BASIC MOBILITY 6.
3-calculated by average/Not able to assess (calculate score using [facility] averaging method)

## 2023-05-28 NOTE — ED PROVIDER NOTE - PHYSICAL EXAMINATION
Gen: Well appearing in NAD  Head: NC/AT  Neck: trachea midline  Resp:  No distress  abd tender bl   Ext: no deformities  Neuro:  A&O appears non focal  Skin:  Warm and dry as visualized  Psych:  Normal affect and mood

## 2023-05-28 NOTE — ED ADULT NURSE REASSESSMENT NOTE - NSFALLHARMRISKINTERV_ED_ALL_ED

## 2023-05-28 NOTE — H&P ADULT - PROBLEM SELECTOR PLAN 4
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

## 2023-05-28 NOTE — H&P ADULT - ASSESSMENT
82y female with PMHx of hypothyroidism, osteoarthritis, s/p left hip replacement (5/24 at [facility]) admitted with abdominal pain/colitis.

## 2023-05-28 NOTE — ED CLERICAL - NS ED CLERK NOTE PRE-ARRIVAL INFORMATION; ADDITIONAL PRE-ARRIVAL INFORMATION
This patient is enrolled in the STARS readmission reduction initiative and has active care navigation. This patient can be followed up by the care navigation team within 24 hours.  To arrange close follow-up or to obtain additional clinical information, please call the care navigation contact number above.      Please page the Orthopedic  PA for input and/or consultation PRIOR to admission.     For patients previously on the Hospitalist Service please call the hospitalist at [phone] for input and/or consultation PRIOR to admission. If the patient was on a Voluntary Physician’s service please contact that physician for input and/or consultation PRIOR to admission.

## 2023-05-28 NOTE — PATIENT PROFILE ADULT - FALL HARM RISK - HARM RISK INTERVENTIONS
Assistance with ambulation/Assistance OOB with selected safe patient handling equipment/Communicate Risk of Fall with Harm to all staff/Discuss with provider need for PT consult/Monitor gait and stability/Provide patient with walking aids - walker, cane, crutches/Reinforce activity limits and safety measures with patient and family/Sit up slowly, dangle for a short time, stand at bedside before walking/Tailored Fall Risk Interventions/Use of alarms - bed, chair and/or voice tab/Visual Cue: Yellow wristband and red socks/Bed in lowest position, wheels locked, appropriate side rails in place/Call bell, personal items and telephone in reach/Instruct patient to call for assistance before getting out of bed or chair/Non-slip footwear when patient is out of bed/[place] to call system/Physically safe environment - no spills, clutter or unnecessary equipment/Purposeful Proactive Rounding/Room/bathroom lighting operational, light cord in reach

## 2023-05-28 NOTE — H&P ADULT - NSICDXPASTSURGICALHX_GEN_ALL_CORE_FT
PAST SURGICAL HISTORY:  History of appendectomy     S/P cataract surgery     S/P hip replacement, left

## 2023-05-28 NOTE — ED ADULT TRIAGE NOTE - CHIEF COMPLAINT QUOTE
Pt BIBA from home c/o abd cramps, constipation. s/p left hip replacement surgery on Wednesday at [place].

## 2023-05-28 NOTE — H&P ADULT - PROBLEM SELECTOR PLAN 2
Secondary to constipation  Pain control with tylenol PRN, avoid opioids  Start bowel regimen with miralax BID, senna qhs and PRN dulcolax  No evidence of obstruction or acute abdomen  Continue PPI GI prophylaxis

## 2023-05-28 NOTE — ED PROVIDER NOTE - CLINICAL SUMMARY MEDICAL DECISION MAKING FREE TEXT BOX
82-year-old female 6 days status post left hip replacement elective, presents with constipation and abdominal pain.  Patient states her last bowel movement was Wednesday before her surgery.  After surgery while she was in the hospital she was receiving OxyContin.  Patient did not take any oxycodone since discharge.  Has been taking senna at night.  Today developed abdominal pain and called the nurses hotline and was told to take MiraLAX and milk of magnesia.  Patient did this and she still did not have another bowel movement.  Patient pain is progressing and then had episode of vomiting.  Now presents to the ER with progressive pain and nausea.  Patient states today she was not able to pass any gas.  Denies fever chills or urinary symptoms.  .  Patient has history of appendectomy no other abdominal surgeries Patient appears uncomfortable abdomen diffusely tender there are bowel sounds.  Rule out SBO versus constipation.  Will check labs CT pain control and reassess

## 2023-05-28 NOTE — PATIENT PROFILE ADULT - INTERNATIONAL TRAVEL
Moderate Oropharyngeal dysphagia:  Able to eat and swallow safely.  No need for PEG;  signing off.  Reconsult prn. No

## 2023-05-28 NOTE — ED ADULT NURSE REASSESSMENT NOTE - NS ED NURSE REASSESS COMMENT FT1
pt resting in stretcher, no active vomiting noted at this time, pt ivf and iv Tylenol being and, iv site dry and intact. left hip surgical dressing noted to be dry and intact, warm to touch no sings of infection noted, no drainage noted. pt pt tolerating po contrast, will continue to monitor pt. Ritika DE LA O

## 2023-05-28 NOTE — H&P ADULT - HISTORY OF PRESENT ILLNESS
82y female with PMHx of hypothyroidism, osteoarthritis, s/p left hip replacement 5/24 at [facility] presented to the ED complaining of abdominal pain and constipation.    In the ED,   Vital Signs TMax(F): 98.4 HR: 60 BP: 155/74 RR: 18 SpO2: 99%  Labs significant for: H/H 10.7/31.1, Glucose 132  CXR - no acute infiltrate on personal review  CT abd/pelvis with PO/IV contrast: No small bowel obstruction.Mild pancolitis with fluid suggesting diarrhea. Diverticulosis. Small hiatal hernia.  EKG:  82y female with PMHx of hypothyroidism, osteoarthritis, s/p left hip replacement 5/24 at [facility] presented to the ED complaining of abdominal pain and constipation. States that her last BM was 5/24 at 3PM before her surgery. Took some oxycodone in the hospital on 5/24 and at 5/25 PM. Stopped taking it since because it was constipating her so much. She has been having abdominal pain on the day of admission. She called the nurse who advised to take miralax and then try milk of magnesia. It was unsuccessful, so she was told to try milk of magnesia again in a few hours. She took it and then became nauseous and started vomiting. She is having difficulty tolerating PO intake with associated abdominal pain and nausea. Pain is mostly in her lower abdomen, rated 7/10 in intensity. No urinary symptoms. Has not used any oxycodone at home and has been managing with tylenol. States that her hip is great and without pain. She is ambulating well. Denies chest pain, palpitations, dyspnea, headache, dizziness, or diarrhea.    In the ED,   Vital Signs TMax(F): 98.4 HR: 60 BP: 155/74 RR: 18 SpO2: 99%  Labs significant for: H/H 10.7/31.1, Glucose 132  CXR - no acute infiltrate on personal review  CT abd/pelvis with PO/IV contrast: No small bowel obstruction. Mild pancolitis with fluid suggesting diarrhea. Diverticulosis. Small hiatal hernia.  UA with trace LE, occasional bacteria, large ketones  EKG: Sinus bradycardia at 59bpm

## 2023-05-28 NOTE — H&P ADULT - PROBLEM SELECTOR PLAN 1
Admit to medicine  CT abdomen/pelvis with PO/IV contrast showed: No small bowel obstruction. Mild pancolitis with fluid suggesting diarrhea. Diverticulosis. Small hiatal hernia.  Doubt any infectious colitis. She is suffering from post-operative constipation, initially opioid induced  UA is suggestive of dehydration  Given cipro/flagyl in the ED, monitor off antibiotics for now  NPO except meds, advance to clears as tolerated  Continue gentle IVF with LR while NPO  GI consult Dr. Leung

## 2023-05-29 LAB
ALBUMIN SERPL ELPH-MCNC: 2.8 G/DL — LOW (ref 3.3–5)
ALP SERPL-CCNC: 96 U/L — SIGNIFICANT CHANGE UP (ref 40–120)
ALT FLD-CCNC: 24 U/L — SIGNIFICANT CHANGE UP (ref 12–78)
ANION GAP SERPL CALC-SCNC: 9 MMOL/L — SIGNIFICANT CHANGE UP (ref 5–17)
AST SERPL-CCNC: 23 U/L — SIGNIFICANT CHANGE UP (ref 15–37)
BASOPHILS # BLD AUTO: 0.01 K/UL — SIGNIFICANT CHANGE UP (ref 0–0.2)
BASOPHILS NFR BLD AUTO: 0.1 % — SIGNIFICANT CHANGE UP (ref 0–2)
BILIRUB SERPL-MCNC: 1.2 MG/DL — SIGNIFICANT CHANGE UP (ref 0.2–1.2)
BUN SERPL-MCNC: 8 MG/DL — SIGNIFICANT CHANGE UP (ref 7–23)
CALCIUM SERPL-MCNC: 8.1 MG/DL — LOW (ref 8.5–10.1)
CHLORIDE SERPL-SCNC: 107 MMOL/L — SIGNIFICANT CHANGE UP (ref 96–108)
CO2 SERPL-SCNC: 23 MMOL/L — SIGNIFICANT CHANGE UP (ref 22–31)
CREAT SERPL-MCNC: 0.49 MG/DL — LOW (ref 0.5–1.3)
EGFR: 93 ML/MIN/1.73M2 — SIGNIFICANT CHANGE UP
EOSINOPHIL # BLD AUTO: 0 K/UL — SIGNIFICANT CHANGE UP (ref 0–0.5)
EOSINOPHIL NFR BLD AUTO: 0 % — SIGNIFICANT CHANGE UP (ref 0–6)
GLUCOSE SERPL-MCNC: 139 MG/DL — HIGH (ref 70–99)
HCT VFR BLD CALC: 30 % — LOW (ref 34.5–45)
HGB BLD-MCNC: 10.2 G/DL — LOW (ref 11.5–15.5)
IMM GRANULOCYTES NFR BLD AUTO: 0.4 % — SIGNIFICANT CHANGE UP (ref 0–0.9)
LYMPHOCYTES # BLD AUTO: 0.99 K/UL — LOW (ref 1–3.3)
LYMPHOCYTES # BLD AUTO: 7.7 % — LOW (ref 13–44)
MAGNESIUM SERPL-MCNC: 3 MG/DL — HIGH (ref 1.6–2.6)
MCHC RBC-ENTMCNC: 30.7 PG — SIGNIFICANT CHANGE UP (ref 27–34)
MCHC RBC-ENTMCNC: 34 GM/DL — SIGNIFICANT CHANGE UP (ref 32–36)
MCV RBC AUTO: 90.4 FL — SIGNIFICANT CHANGE UP (ref 80–100)
MONOCYTES # BLD AUTO: 0.66 K/UL — SIGNIFICANT CHANGE UP (ref 0–0.9)
MONOCYTES NFR BLD AUTO: 5.1 % — SIGNIFICANT CHANGE UP (ref 2–14)
NEUTROPHILS # BLD AUTO: 11.16 K/UL — HIGH (ref 1.8–7.4)
NEUTROPHILS NFR BLD AUTO: 86.7 % — HIGH (ref 43–77)
NRBC # BLD: 0 /100 WBCS — SIGNIFICANT CHANGE UP (ref 0–0)
PHOSPHATE SERPL-MCNC: 3.6 MG/DL — SIGNIFICANT CHANGE UP (ref 2.5–4.5)
PLATELET # BLD AUTO: SIGNIFICANT CHANGE UP K/UL (ref 150–400)
POTASSIUM SERPL-MCNC: 3.5 MMOL/L — SIGNIFICANT CHANGE UP (ref 3.5–5.3)
POTASSIUM SERPL-SCNC: 3.5 MMOL/L — SIGNIFICANT CHANGE UP (ref 3.5–5.3)
PROT SERPL-MCNC: 6.3 G/DL — SIGNIFICANT CHANGE UP (ref 6–8.3)
RBC # BLD: 3.32 M/UL — LOW (ref 3.8–5.2)
RBC # FLD: 13.1 % — SIGNIFICANT CHANGE UP (ref 10.3–14.5)
SODIUM SERPL-SCNC: 139 MMOL/L — SIGNIFICANT CHANGE UP (ref 135–145)
WBC # BLD: 12.87 K/UL — HIGH (ref 3.8–10.5)
WBC # FLD AUTO: 12.87 K/UL — HIGH (ref 3.8–10.5)

## 2023-05-29 PROCEDURE — 99221 1ST HOSP IP/OBS SF/LOW 40: CPT

## 2023-05-29 PROCEDURE — 93010 ELECTROCARDIOGRAM REPORT: CPT

## 2023-05-29 PROCEDURE — 99233 SBSQ HOSP IP/OBS HIGH 50: CPT

## 2023-05-29 RX ADMIN — CELECOXIB 200 MILLIGRAM(S): 200 CAPSULE ORAL at 18:08

## 2023-05-29 RX ADMIN — POLYETHYLENE GLYCOL 3350 17 GRAM(S): 17 POWDER, FOR SOLUTION ORAL at 05:39

## 2023-05-29 RX ADMIN — ATORVASTATIN CALCIUM 10 MILLIGRAM(S): 80 TABLET, FILM COATED ORAL at 21:27

## 2023-05-29 RX ADMIN — PANTOPRAZOLE SODIUM 40 MILLIGRAM(S): 20 TABLET, DELAYED RELEASE ORAL at 12:38

## 2023-05-29 RX ADMIN — CELECOXIB 200 MILLIGRAM(S): 200 CAPSULE ORAL at 05:40

## 2023-05-29 RX ADMIN — Medication 50 MICROGRAM(S): at 05:40

## 2023-05-29 RX ADMIN — CELECOXIB 200 MILLIGRAM(S): 200 CAPSULE ORAL at 06:40

## 2023-05-29 RX ADMIN — ENOXAPARIN SODIUM 40 MILLIGRAM(S): 100 INJECTION SUBCUTANEOUS at 21:27

## 2023-05-29 NOTE — PROGRESS NOTE ADULT - PROBLEM SELECTOR PLAN 1
p/w n/v, abd pain and constipation  CT abdomen/pelvis with PO/IV contrast showed: No small bowel obstruction. Mild pancolitis with fluid suggesting diarrhea. Diverticulosis. Small hiatal hernia.  Doubt pt has infectious colitis. Pt had post-operative constipation and suspect the mild colitis was due to constipation and subsequent laxatives.  Pt had 4 loose BMs overnight after laxatives and is symptomatically improving.  Will hold further laxatives for now.  discussed with ID (Ian) and GI (Xochitl), recs appreciated  will monitor off antibiotics   advanced to clear liquid diet ; as pt continues to improve, will advance diet further  since pt had recent hospitalization, had T of 100F and now increased WBC to 12.87, with pancolitis on CT, ID rec to send GI PCR and c diff PCR if pt has further loose stools to r/out infection   contact isolation precautions pending those studies

## 2023-05-29 NOTE — CONSULT NOTE ADULT - SUBJECTIVE AND OBJECTIVE BOX
82y female with PMHx of hypothyroidism, osteoarthritis, s/p left hip replacement 5/24 at [facility] presented to the ED complaining of abdominal pain and constipation. States that her last BM was 5/24 at 3PM before her surgery. Took some oxycodone in the hospital on 5/24 and at 5/25 PM. Stopped taking it since because it was constipating her so much. She has been having abdominal pain on the day of admission. She called the nurse who advised to take miralax and then try milk of magnesia. It was unsuccessful, so she was told to try milk of magnesia again in a few hours. She took it and then became nauseous and started vomiting. She is having difficulty tolerating PO intake with associated abdominal pain and nausea. Pain is mostly in her lower abdomen, rated 7/10 in intensity. No urinary symptoms. Has not used any oxycodone at home and has been managing with tylenol. States that her hip is great and without pain. She is ambulating well. Denies chest pain, palpitations, dyspnea, headache, dizziness, or diarrhea.            MEDICATIONS  (STANDING):  atorvastatin 10 milliGRAM(s) Oral at bedtime  celecoxib 200 milliGRAM(s) Oral two times a day  enoxaparin Injectable 40 milliGRAM(s) SubCutaneous every 24 hours  lactated ringers. 1000 milliLiter(s) (75 mL/Hr) IV Continuous <Continuous>  levothyroxine 50 MICROGram(s) Oral <User Schedule>  levothyroxine 75 MICROGram(s) Oral <User Schedule>  pantoprazole    Tablet 40 milliGRAM(s) Oral daily    MEDICATIONS  (PRN):                        10.7   9.22  )-----------( 309      ( 28 May 2023 18:04 )             31.1   acetaminophen     Tablet .. 650 milliGRAM(s) Oral every 6 hours PRN Temp greater or equal to 38C (100.4F), Mild Pain (1 - 3)  bisacodyl 5 milliGRAM(s) Oral every 12 hours PRN Constipation  melatonin 3 milliGRAM(s) Oral at bedtime PRN Insomnia  ondansetron Injectable 4 milliGRAM(s) IV Push every 8 hours PRN Nausea and/or Vomiting      Allergies:  latex (Hives)  No Known Drug Allergies    PAST MEDICAL & SURGICAL HISTORY:  Hypothyroid  Osteoarthritis  History of appendectomy  S/P cataract surgery  S/P hip replacement, left    Social History:  Lives at home. Ambulating with walker. (28 May 2023 20:55)    FAMILY HISTORY:  FHx: breast cancer (Sibling, Child)      Vital Signs Last 24 Hrs  T(C): 37.8 (28 May 2023 23:35), Max: 37.8 (28 May 2023 23:35)  T(F): 100 (28 May 2023 23:35), Max: 100 (28 May 2023 23:35)  HR: 63 (29 May 2023 09:52) (56 - 65)  BP: 145/58 (29 May 2023 09:52) (139/68 - 155/74)  BP(mean): --  RR: 20 (28 May 2023 23:35) (16 - 20)  SpO2: 96% (29 May 2023 09:52) (96% - 99%)    05-29    139  |  107  |  8   ----------------------------<  139<H>  3.5   |  23  |  0.49<L>    Ca    8.1<L>      29 May 2023 08:05  Phos  3.6     05-29  Mg     3.0     05-29    TPro  6.3  /  Alb  2.8<L>  /  TBili  1.2  /  DBili  x   /  AST  23  /  ALT  24  /  AlkPhos  96  05-29      ACC: [id number] EXAM:  CT ABDOMEN AND PELVIS OC IC   ORDERED BY: EDEN KILGORE     PROCEDURE DATE:  05/28/2023          INTERPRETATION:  CLINICAL INFORMATION: Abdominal pain, evaluate for small   bowel obstruction    COMPARISON: None.    CONTRAST/COMPLICATIONS:  IV Contrast: Omnipaque 350  90 cc administered   10 cc discarded  Oral Contrast: Omnipaque 300  Complications: None reported at time of study completion    PROCEDURE:  CT of the Abdomen and Pelvis was performed.  Sagittal and coronal reformats were performed.    FINDINGS:  LOWER CHEST: The heart is enlarged. Dependent atelectatic changes.    LIVER: Within normal limits.  BILE DUCTS: Normal caliber.  GALLBLADDER: Within normal limits.  SPLEEN: Within normal limits.  PANCREAS: Within normal limits.  ADRENALS: Within normal limits.  KIDNEYS/URETERS: Within normal limits.    BLADDER: Incompletely distended.  REPRODUCTIVE ORGANS: No pelvic masses.    BOWEL: Small hiatal hernia. No bowel obstruction. Appendix is   unremarkable. Mild thickening of the colon compatible with colitis. The   colon is distended with fluid which may be seen with diarrhea. Scattered   diffuse colonic diverticulosis.  PERITONEUM: Trace amount of pelvic ascites.  VESSELS: Aorta is not dilated. Moderate atherosclerotic vascular   calcification. RETROPERITONEUM/LYMPH NODES: No lymphadenopathy.  ABDOMINAL WALL: Small fat-containing umbilical hernia.  BONES: Left total hip arthroplasty.    IMPRESSION:  No small bowel obstruction.  Mild pancolitis with fluid suggesting diarrhea.  Diverticulosis.  Small hiatal hernia.    --- End of Report ---      CAROLINE STAUFFER MD; Attending Radiologist  This document has been electronically signed. May 28 2023  8:28PM            Parameters below as of 29 May 2023 09:52  Patient On (Oxygen Delivery Method): room air     82y female with PMHx of hypothyroidism, osteoarthritis, s/p left hip replacement 5/24 at [facility] presented to the ED complaining of abdominal pain and constipation. States that her last BM was 5/24 at 3PM before her surgery. Took some oxycodone in the hospital on 5/24 and at 5/25 PM. Stopped taking it since because it was constipating her so much. She has been having abdominal pain on the day of admission. She called the nurse who advised to take miralax and then try milk of magnesia. It was unsuccessful, so she was told to try milk of magnesia again in a few hours. She took it and then became nauseous and started vomiting (no blood).     She says early this morning she had 4 to 5 bowel movements - pudding shape stool, no melena and no hematochezia.  Says abdominal cramping better.  No long vomiting - eating jello currently.      Denies fevers.      Says has had 3 colonoscopies in her life time normal.   All other ROS negative.          MEDICATIONS  (STANDING):  atorvastatin 10 milliGRAM(s) Oral at bedtime  celecoxib 200 milliGRAM(s) Oral two times a day  enoxaparin Injectable 40 milliGRAM(s) SubCutaneous every 24 hours  lactated ringers. 1000 milliLiter(s) (75 mL/Hr) IV Continuous <Continuous>  levothyroxine 50 MICROGram(s) Oral <User Schedule>  levothyroxine 75 MICROGram(s) Oral <User Schedule>  pantoprazole    Tablet 40 milliGRAM(s) Oral daily    MEDICATIONS  (PRN):                        10.7   9.22  )-----------( 309      ( 28 May 2023 18:04 )             31.1   acetaminophen     Tablet .. 650 milliGRAM(s) Oral every 6 hours PRN Temp greater or equal to 38C (100.4F), Mild Pain (1 - 3)  bisacodyl 5 milliGRAM(s) Oral every 12 hours PRN Constipation  melatonin 3 milliGRAM(s) Oral at bedtime PRN Insomnia  ondansetron Injectable 4 milliGRAM(s) IV Push every 8 hours PRN Nausea and/or Vomiting      Allergies:  latex (Hives)  No Known Drug Allergies    PAST MEDICAL & SURGICAL HISTORY:  Hypothyroid  Osteoarthritis  History of appendectomy  S/P cataract surgery  S/P hip replacement, left    Social History:  Lives at home. Ambulating with walker. (28 May 2023 20:55)    FAMILY HISTORY:  FHx: breast cancer (Sibling, Child)      Vital Signs Last 24 Hrs  T(C): 37.8 (28 May 2023 23:35), Max: 37.8 (28 May 2023 23:35)  T(F): 100 (28 May 2023 23:35), Max: 100 (28 May 2023 23:35)  HR: 63 (29 May 2023 09:52) (56 - 65)  BP: 145/58 (29 May 2023 09:52) (139/68 - 155/74)  BP(mean): --  RR: 20 (28 May 2023 23:35) (16 - 20)  SpO2: 96% (29 May 2023 09:52) (96% - 99%)    HEENT:  NCAT  LUNG:  CTA b/l  CV:  Nml s1 and s2  Abdomen:  Soft, NT, ND, +BS  Ext:  no e/c/c          05-29    139  |  107  |  8   ----------------------------<  139<H>  3.5   |  23  |  0.49<L>    Ca    8.1<L>      29 May 2023 08:05  Phos  3.6     05-29  Mg     3.0     05-29    TPro  6.3  /  Alb  2.8<L>  /  TBili  1.2  /  DBili  x   /  AST  23  /  ALT  24  /  AlkPhos  96  05-29      ACC: [id number] EXAM:  CT ABDOMEN AND PELVIS OC IC   ORDERED BY: EDEN KILGORE     PROCEDURE DATE:  05/28/2023          INTERPRETATION:  CLINICAL INFORMATION: Abdominal pain, evaluate for small   bowel obstruction    COMPARISON: None.    CONTRAST/COMPLICATIONS:  IV Contrast: Omnipaque 350  90 cc administered   10 cc discarded  Oral Contrast: Omnipaque 300  Complications: None reported at time of study completion    PROCEDURE:  CT of the Abdomen and Pelvis was performed.  Sagittal and coronal reformats were performed.    FINDINGS:  LOWER CHEST: The heart is enlarged. Dependent atelectatic changes.    LIVER: Within normal limits.  BILE DUCTS: Normal caliber.  GALLBLADDER: Within normal limits.  SPLEEN: Within normal limits.  PANCREAS: Within normal limits.  ADRENALS: Within normal limits.  KIDNEYS/URETERS: Within normal limits.    BLADDER: Incompletely distended.  REPRODUCTIVE ORGANS: No pelvic masses.    BOWEL: Small hiatal hernia. No bowel obstruction. Appendix is   unremarkable. Mild thickening of the colon compatible with colitis. The   colon is distended with fluid which may be seen with diarrhea. Scattered   diffuse colonic diverticulosis.  PERITONEUM: Trace amount of pelvic ascites.  VESSELS: Aorta is not dilated. Moderate atherosclerotic vascular   calcification. RETROPERITONEUM/LYMPH NODES: No lymphadenopathy.  ABDOMINAL WALL: Small fat-containing umbilical hernia.  BONES: Left total hip arthroplasty.    IMPRESSION:  No small bowel obstruction.  Mild pancolitis with fluid suggesting diarrhea.  Diverticulosis.  Small hiatal hernia.    --- End of Report ---      CAROLINE STAUFFER MD; Attending Radiologist  This document has been electronically signed. May 28 2023  8:28PM            Parameters below as of 29 May 2023 09:52  Patient On (Oxygen Delivery Method): room air

## 2023-05-29 NOTE — PHYSICAL THERAPY INITIAL EVALUATION ADULT - ADDITIONAL COMMENTS
Patient lives with spouse in a private house, 2 steps to enter with handrails and 1 flight within to bedroom/bathroom and 1 flight to basement for laundry. pt reports since THR she has been sleeping on main level of her home. Pt has been ambulating with rolling walker since surgery, otherwise independent in ADLs. Pt participated in 1 session of home PT since surgery.

## 2023-05-29 NOTE — PROGRESS NOTE ADULT - PROBLEM SELECTOR PLAN 2
abd pain likely was secondary to severe constipation / laxatives with mild subsequent coliits  Pain control with tylenol PRN, avoid opioids  Now had 4 BMs and is improving; hold further laxatives for now  No evidence of obstruction or acute abdomen  Continue PPI GI prophylaxis as pt had n/v, as well

## 2023-05-29 NOTE — PHYSICAL THERAPY INITIAL EVALUATION ADULT - RANGE OF MOTION EXAMINATION, REHAB EVAL
left knee/ankle ROM WFL, left hip flexion grossly 0-80 degrees/bilateral upper extremity ROM was WFL (within functional limits)/Right LE ROM was WFL (within functional limits)

## 2023-05-29 NOTE — CONSULT NOTE ADULT - SUBJECTIVE AND OBJECTIVE BOX
[facility]  INFECTIOUS DISEASES - [address], [place] [address]  Tel: [phone]     Fax: [phone]  =======================================================    [id number]  DAMION HOWELL     CC: Patient is a 82y old  Female who presents with a chief complaint of abdominal pain (29 May 2023 11:16)    HPI:  82y female with PMHx of hypothyroidism, osteoarthritis, s/p left hip replacement  at [facility], who presented with abdominal pain and constipation. States that her last BM was  at 3PM before her surgery. Took some oxycodone in the hospital on  and at  PM. Stopped taking it since because it was constipating her so much. She developed abdominal pain on the day of admission, so she called the nurse who advised her to take miralax and then try milk of magnesia. She later became nauseous and started vomiting. Reports feeling better now, nausea improved and just has some mild abdominal discomfort. Denies any sick contacts or recent travel. Denies any eating any new foods. Denies any recent antibiotic use. She denies any left hip pain and says she has been ambulating after the surgery.        PAST MEDICAL & SURGICAL HISTORY:  Hypothyroid      Osteoarthritis      History of appendectomy      S/P cataract surgery      S/P hip replacement, left          Social Hx:     FAMILY HISTORY:  FHx: breast cancer (Sibling, Child)        Allergies    latex (Hives)  No Known Drug Allergies    Intolerances        Antibiotics:  MEDICATIONS  (STANDING):  atorvastatin 10 milliGRAM(s) Oral at bedtime  celecoxib 200 milliGRAM(s) Oral two times a day  enoxaparin Injectable 40 milliGRAM(s) SubCutaneous every 24 hours  lactated ringers. 1000 milliLiter(s) (75 mL/Hr) IV Continuous <Continuous>  levothyroxine 50 MICROGram(s) Oral <User Schedule>  levothyroxine 75 MICROGram(s) Oral <User Schedule>  pantoprazole    Tablet 40 milliGRAM(s) Oral daily    MEDICATIONS  (PRN):  acetaminophen     Tablet .. 650 milliGRAM(s) Oral every 6 hours PRN Temp greater or equal to 38C (100.4F), Mild Pain (1 - 3)  melatonin 3 milliGRAM(s) Oral at bedtime PRN Insomnia  ondansetron Injectable 4 milliGRAM(s) IV Push every 8 hours PRN Nausea and/or Vomiting       REVIEW OF SYSTEMS:  CONSTITUTIONAL:  No Fever or chills  HEENT:  No sore throat or runny nose.  CARDIOVASCULAR:  No chest pain or SOB.  RESPIRATORY:  No cough, shortness of breath  GASTROINTESTINAL:  see history  GENITOURINARY:  No dysuria, frequency or urgency  MUSCULOSKELETAL:  no back pain  SKIN:  No change in skin, hair or nails.  NEUROLOGIC:  No headache or dizziness  PSYCHIATRIC:  No disorder of thought or mood.    Physical Exam:  Vital Signs Last 24 Hrs  T(C): 36.8 (29 May 2023 12:11), Max: 37.8 (28 May 2023 23:35)  T(F): 98.3 (29 May 2023 12:11), Max: 100 (28 May 2023 23:35)  HR: 64 (29 May 2023 12:11) (56 - 65)  BP: 133/69 (29 May 2023 12:11) (133/69 - 155/74)  BP(mean): --  RR: 17 (29 May 2023 12:11) (16 - 20)  SpO2: 97% (29 May 2023 12:11) (96% - 99%)    Parameters below as of 29 May 2023 12:11  Patient On (Oxygen Delivery Method): room air      Height (cm): 165.1 ( @ 17:22)  Weight (kg): 78.9 ( @ 17:22)  BMI (kg/m2): 28.9 ( @ 17:22)  BSA (m2): 1.86 ( @ 17:22)    GEN: NAD  HEENT: normocephalic and atraumatic.   NECK: Supple.   LUNGS: Clear to auscultation.  HEART: Regular rate and rhythm   ABDOMEN: Soft, nontender, and nondistended.    EXTREMITIES: No L hip tenderness; No leg edema.  NEUROLOGIC: grossly intact.  PSYCHIATRIC: Appropriate affect .      Labs:      139  |  107  |  8   ----------------------------<  139<H>  3.5   |  23  |  0.49<L>    Ca    8.1<L>      29 May 2023 08:05  Phos  3.6       Mg     3.0         TPro  6.3  /  Alb  2.8<L>  /  TBili  1.2  /  DBili  x   /  AST  23  /  ALT  24  /  AlkPhos  96                            10.2   12.87 )-----------( Clumped    ( 29 May 2023 08:05 )             30.0       Urinalysis Basic - ( 28 May 2023 19:20 )    Color: Yellow / Appearance: Clear / S.020 / pH: x  Gluc: x / Ketone: Large  / Bili: Negative / Urobili: 4   Blood: x / Protein: Negative / Nitrite: Negative   Leuk Esterase: Trace / RBC: 3-5 /HPF / WBC 3-5   Sq Epi: x / Non Sq Epi: x / Bacteria: Occasional      LIVER FUNCTIONS - ( 29 May 2023 08:05 )  Alb: 2.8 g/dL / Pro: 6.3 g/dL / ALK PHOS: 96 U/L / ALT: 24 U/L / AST: 23 U/L / GGT: x                           RECENT CULTURES:        All imaging and other data have been reviewed.    CT A/P:  FINDINGS:  LOWER CHEST: The heart is enlarged. Dependent atelectatic changes.    LIVER: Within normal limits.  BILE DUCTS: Normal caliber.  GALLBLADDER: Within normal limits.  SPLEEN: Within normal limits.  PANCREAS: Within normal limits.  ADRENALS: Within normal limits.  KIDNEYS/URETERS: Within normal limits.    BLADDER: Incompletely distended.  REPRODUCTIVE ORGANS: No pelvic masses.    BOWEL: Small hiatal hernia. No bowel obstruction. Appendix is   unremarkable. Mild thickening of the colon compatible with colitis. The   colon is distended with fluid which may be seen with diarrhea. Scattered   diffuse colonic diverticulosis.  PERITONEUM: Trace amount of pelvic ascites.  VESSELS: Aorta is not dilated. Moderate atherosclerotic vascular   calcification. RETROPERITONEUM/LYMPH NODES: No lymphadenopathy.  ABDOMINAL WALL: Small fat-containing umbilical hernia.  BONES: Left total hip arthroplasty.    IMPRESSION:  No small bowel obstruction.  Mild pancolitis with fluid suggesting diarrhea.  Diverticulosis.  Small hiatal hernia.      CXR:  FINDINGS:  Heart/Vascular: The mediastinum, hilum and aorta are within normal limits   for projection. Heart size borderline  Pulmonary: Midline trachea. There is no focal infiltrate, congestion or   effusion.  Bones: There is no fracture.  Lines and catheter: None    Impression:    No acute pulmonary disease.

## 2023-05-29 NOTE — PHYSICAL THERAPY INITIAL EVALUATION ADULT - PLANNED THERAPY INTERVENTIONS, PT EVAL
Patient will negotiate 10 steps with step-to pattern and bilateral handrail with supervision by 2 weeks to allow for increased independence in the community./gait training

## 2023-05-29 NOTE — PROGRESS NOTE ADULT - PROBLEM/PLAN-4
Patient here for annual pap & pelvic exam.   Last pap was 03/25/2019 which was normal.    Not performed-HRHPV. Pap smear history includes: MILLER 1 with colposcopy evaluation 2014  Patients last menstrual period was:  11/16/2020  Periods are normal.    Cycles are 28-30 days apart when using nuvaring  Last mammogram was n/a   BSE's:  Not peforming  Last BMD test was n/a. Family Hx of CA reviewed and updated. Allergies: Denies known Latex allergy or symptoms of Latex sensitivity. Current meds reviewed and updated. Smoking/ETOH hx:              Social History     Tobacco Use   â¢ Smoking status: Never Smoker   â¢ Smokeless tobacco: Never Used   â¢ Tobacco comment:  of an eye clinic   Substance Use Topics   â¢ Alcohol use: Yes     Alcohol/week: 0.0 standard drinks     Frequency: Never     Binge frequency: Never     Comment: Ocassionally wine        Contraception: nuvaring    Concerns/problems:   1.  none. Health maintance reviewed and updated as needed. Pharmacy updated: Pharmacy has been verified and loaded. [facility]:active    Health Maintenance: Defer to PCP. Patient would like communication of their results via:        Cell Phone:   Telephone Information:   Mobile [phone] to leave a message containing results? Yes      Pap orders placed per MD verbal order. DISPLAY PLAN FREE TEXT

## 2023-05-29 NOTE — PHYSICAL THERAPY INITIAL EVALUATION ADULT - PATIENT PROFILE REVIEW, REHAB EVAL
PT orders received: ambulate with assistance. Consult with RN Vineet, pt may participate in PT evaluation./yes

## 2023-05-29 NOTE — CONSULT NOTE ADULT - ASSESSMENT
82y female with PMHx of hypothyroidism, osteoarthritis, s/p left hip replacement 5/24 at [facility], who presented with abdominal pain and constipation. Found to have evidence of mild pancolitis on CT--suspect her symptoms are more related to recent use of narcotics and laxatives rather than infection. However if with loose stools suggest sending for infectious workup.     She had a low grade temp overnight and with mild leukocytosis of 12 today but reports feeling better overall and no other obvious signs/symptoms of infection. Suspect leukocytosis is reactive, will monitor and observe off antibiotics. Urinalysis appears bland and no evidence of pneumonia on CXR.    -observe off antibiotics  -if with loose stools send for GI PCR and c diff  -suggest blood cultures if febrile  -monitor WBC    Thank you for courtesy of this consult.     Will follow.  Discussed with Dr. Alison Sosa MD  Division of Infectious Diseases   Cell [phone] between 8am and 6pm   After 6pm and weekends please call ID service at [phone].

## 2023-05-29 NOTE — PHYSICAL THERAPY INITIAL EVALUATION ADULT - PERTINENT HX OF CURRENT PROBLEM, REHAB EVAL
Patient is an 82 year old female s/p left hip replacement 5/24 (anterior approach) at [facility] presented to the ED complaining of abdominal pain and constipation. States that her last BM was 5/24 at 3PM before her surgery. PMH hypothyroidism, osteoarthritis.

## 2023-05-29 NOTE — PROGRESS NOTE ADULT - TIME BILLING
Note written by attending, see above.  Time spent: 50min. More than 50% of the visit was spent obtaining HPI personally and subsequently counseling the patient on medical condition - pancolitis on CT, suspected etiology of severe constipation and subsequent laxatives, r/out GI PCR / c diff if having more loose stools, diet advancement.

## 2023-05-29 NOTE — CONSULT NOTE ADULT - ASSESSMENT
IMPRESSION:   #  Abdominal pain with history constipation likely due to narcotics   -  CT scan of the A/P with IV contrast on 5/28/2023:  Enlarged heart.  Mild pancolitis with fluid suggesting diarrhea.  Diverticulosis.  Small hiatal hernia.     #  Comorbidities:  PMHx of hypothyroidism, osteoarthritis, s/p left hip replacement 5/24 at [facility], cardiomegaly on recent CT scan          PLAN   IMPRESSION:   #  Abdominal pain with constipation likely due to narcotics from recent hip replacement - now bowel movements after recent laxatives   -  CT scan of the A/P with IV contrast on 5/28/2023:  Enlarged heart.  Mild pancolitis with fluid suggesting diarrhea.  Diverticulosis.  Small hiatal hernia.     #  Comorbidities:  PMHx of hypothyroidism, osteoarthritis, s/p left hip replacement 5/24 at [facility], cardiomegaly on recent CT scan        PLAN  GI PCR  Monitor clinically off of antibiotics

## 2023-05-29 NOTE — PROGRESS NOTE ADULT - SUBJECTIVE AND OBJECTIVE BOX
Name band; Patient is a 82y old  Female who presents with a chief complaint of abdominal pain, nausea, vomiting.       INTERVAL HPI/OVERNIGHT EVENTS: Pt states she had 4 large loose BMs overnight after receiving laxatives and now feels better. Nausea is resolving. Abd pain is improving. No vomiting today. Tolerated clear liquid diet but only ate half her food and does not feel ready to advance to thicker food yet. Pt denies fever, chills, SOB, CP, cough, dysuria, flank pain.    MEDICATIONS  (STANDING):  atorvastatin 10 milliGRAM(s) Oral at bedtime  celecoxib 200 milliGRAM(s) Oral two times a day  enoxaparin Injectable 40 milliGRAM(s) SubCutaneous every 24 hours  levothyroxine 50 MICROGram(s) Oral <User Schedule>  levothyroxine 75 MICROGram(s) Oral <User Schedule>  pantoprazole    Tablet 40 milliGRAM(s) Oral daily    MEDICATIONS  (PRN):  acetaminophen     Tablet .. 650 milliGRAM(s) Oral every 6 hours PRN Temp greater or equal to 38C (100.4F), Mild Pain (1 - 3)  melatonin 3 milliGRAM(s) Oral at bedtime PRN Insomnia  ondansetron Injectable 4 milliGRAM(s) IV Push every 8 hours PRN Nausea and/or Vomiting      Allergies    latex (Hives)  No Known Drug Allergies    Intolerances        REVIEW OF SYSTEMS:  CONSTITUTIONAL: No fever or chills  HEENT:  No headache, no sore throat  RESPIRATORY: No cough, wheezing, or shortness of breath  CARDIOVASCULAR: No chest pain, palpitations  GASTROINTESTINAL: +abd pain (improving); nausea resolving, no vomiting; 4 loose BMs overnight after laxatives  GENITOURINARY: No dysuria, frequency, or hematuria  NEUROLOGICAL: no focal weakness or dizziness  MUSCULOSKELETAL: no myalgias     Vital Signs Last 24 Hrs  T(C): 36.8 (29 May 2023 12:11), Max: 37.8 (28 May 2023 23:35)  T(F): 98.3 (29 May 2023 12:11), Max: 100 (28 May 2023 23:35)  HR: 64 (29 May 2023 12:11) (56 - 65)  BP: 133/69 (29 May 2023 12:11) (133/69 - 155/74)  BP(mean): --  RR: 17 (29 May 2023 12:11) (16 - 20)  SpO2: 97% (29 May 2023 12:11) (96% - 99%)    Parameters below as of 29 May 2023 12:11  Patient On (Oxygen Delivery Method): room air        PHYSICAL EXAM:  GENERAL: NAD  HEENT:  anicteric, moist mucous membranes  CHEST/LUNG:  CTA b/l, no rales, wheezes, or rhonchi  HEART:  RRR, S1, S2  ABDOMEN:  BS+, soft, nontender, nondistended  EXTREMITIES: no edema, cyanosis, or calf tenderness  NERVOUS SYSTEM: answers questions and follows commands appropriately    LABS:                        10.2   12.87 )-----------( Clumped    ( 29 May 2023 08:05 )             30.0     CBC Full  -  ( 29 May 2023 08:05 )  WBC Count : 12.87 K/uL  Hemoglobin : 10.2 g/dL  Hematocrit : 30.0 %  Platelet Count - Automated : Clumped K/uL  Mean Cell Volume : 90.4 fl  Mean Cell Hemoglobin : 30.7 pg  Mean Cell Hemoglobin Concentration : 34.0 gm/dL  Auto Neutrophil # : 11.16 K/uL  Auto Lymphocyte # : 0.99 K/uL  Auto Monocyte # : 0.66 K/uL  Auto Eosinophil # : 0.00 K/uL  Auto Basophil # : 0.01 K/uL  Auto Neutrophil % : 86.7 %  Auto Lymphocyte % : 7.7 %  Auto Monocyte % : 5.1 %  Auto Eosinophil % : 0.0 %  Auto Basophil % : 0.1 %    29 May 2023 08:05    139    |  107    |  8      ----------------------------<  139    3.5     |  23     |  0.49     Ca    8.1        29 May 2023 08:05  Phos  3.6       29 May 2023 08:05  Mg     3.0       29 May 2023 08:05    TPro  6.3    /  Alb  2.8    /  TBili  1.2    /  DBili  x      /  AST  23     /  ALT  24     /  AlkPhos  96     29 May 2023 08:05      Urinalysis Basic - ( 28 May 2023 19:20 )    Color: Yellow / Appearance: Clear / S.020 / pH: x  Gluc: x / Ketone: Large  / Bili: Negative / Urobili: 4   Blood: x / Protein: Negative / Nitrite: Negative   Leuk Esterase: Trace / RBC: 3-5 /HPF / WBC 3-5   Sq Epi: x / Non Sq Epi: x / Bacteria: Occasional      CAPILLARY BLOOD GLUCOSE              RADIOLOGY & ADDITIONAL TESTS:    Personally reviewed.     Consultant(s) Notes Reviewed:  [x] YES  [ ] NO     Patient is a 82y old  Female who presents with a chief complaint of abdominal pain, nausea, vomiting.        INTERVAL HPI/OVERNIGHT EVENTS: Pt states she had 4 large loose BMs overnight after receiving laxatives and now feels better. Nausea is resolving. Abd pain is improving. No vomiting today. Tolerated clear liquid diet but only ate half her food and does not feel ready to advance to thicker food yet. Pt denies fever, chills, SOB, CP, cough, dysuria, flank pain.    MEDICATIONS  (STANDING):  atorvastatin 10 milliGRAM(s) Oral at bedtime  celecoxib 200 milliGRAM(s) Oral two times a day  enoxaparin Injectable 40 milliGRAM(s) SubCutaneous every 24 hours  levothyroxine 50 MICROGram(s) Oral <User Schedule>  levothyroxine 75 MICROGram(s) Oral <User Schedule>  pantoprazole    Tablet 40 milliGRAM(s) Oral daily    MEDICATIONS  (PRN):  acetaminophen     Tablet .. 650 milliGRAM(s) Oral every 6 hours PRN Temp greater or equal to 38C (100.4F), Mild Pain (1 - 3)  melatonin 3 milliGRAM(s) Oral at bedtime PRN Insomnia  ondansetron Injectable 4 milliGRAM(s) IV Push every 8 hours PRN Nausea and/or Vomiting      Allergies    latex (Hives)  No Known Drug Allergies    Intolerances        REVIEW OF SYSTEMS:  CONSTITUTIONAL: No fever or chills  HEENT:  No headache, no sore throat  RESPIRATORY: No cough, wheezing, or shortness of breath  CARDIOVASCULAR: No chest pain, palpitations  GASTROINTESTINAL: +abd pain (improving); nausea resolving, no vomiting; 4 loose BMs overnight after laxatives  GENITOURINARY: No dysuria, frequency, or hematuria  NEUROLOGICAL: no focal weakness or dizziness  MUSCULOSKELETAL: no myalgias     Vital Signs Last 24 Hrs  T(C): 36.8 (29 May 2023 12:11), Max: 37.8 (28 May 2023 23:35)  T(F): 98.3 (29 May 2023 12:11), Max: 100 (28 May 2023 23:35)  HR: 64 (29 May 2023 12:11) (56 - 65)  BP: 133/69 (29 May 2023 12:11) (133/69 - 155/74)  BP(mean): --  RR: 17 (29 May 2023 12:11) (16 - 20)  SpO2: 97% (29 May 2023 12:11) (96% - 99%)    Parameters below as of 29 May 2023 12:11  Patient On (Oxygen Delivery Method): room air        PHYSICAL EXAM:  GENERAL: NAD  HEENT:  anicteric, moist mucous membranes  CHEST/LUNG:  CTA b/l, no rales, wheezes, or rhonchi  HEART:  RRR, S1, S2  ABDOMEN:  BS+, soft, nontender, nondistended  EXTREMITIES: no edema, cyanosis, or calf tenderness  NERVOUS SYSTEM: answers questions and follows commands appropriately    LABS:                        10.2   12.87 )-----------( Clumped    ( 29 May 2023 08:05 )             30.0     CBC Full  -  ( 29 May 2023 08:05 )  WBC Count : 12.87 K/uL  Hemoglobin : 10.2 g/dL  Hematocrit : 30.0 %  Platelet Count - Automated : Clumped K/uL  Mean Cell Volume : 90.4 fl  Mean Cell Hemoglobin : 30.7 pg  Mean Cell Hemoglobin Concentration : 34.0 gm/dL  Auto Neutrophil # : 11.16 K/uL  Auto Lymphocyte # : 0.99 K/uL  Auto Monocyte # : 0.66 K/uL  Auto Eosinophil # : 0.00 K/uL  Auto Basophil # : 0.01 K/uL  Auto Neutrophil % : 86.7 %  Auto Lymphocyte % : 7.7 %  Auto Monocyte % : 5.1 %  Auto Eosinophil % : 0.0 %  Auto Basophil % : 0.1 %    29 May 2023 08:05    139    |  107    |  8      ----------------------------<  139    3.5     |  23     |  0.49     Ca    8.1        29 May 2023 08:05  Phos  3.6       29 May 2023 08:05  Mg     3.0       29 May 2023 08:05    TPro  6.3    /  Alb  2.8    /  TBili  1.2    /  DBili  x      /  AST  23     /  ALT  24     /  AlkPhos  96     29 May 2023 08:05      Urinalysis Basic - ( 28 May 2023 19:20 )    Color: Yellow / Appearance: Clear / S.020 / pH: x  Gluc: x / Ketone: Large  / Bili: Negative / Urobili: 4   Blood: x / Protein: Negative / Nitrite: Negative   Leuk Esterase: Trace / RBC: 3-5 /HPF / WBC 3-5   Sq Epi: x / Non Sq Epi: x / Bacteria: Occasional      CAPILLARY BLOOD GLUCOSE              RADIOLOGY & ADDITIONAL TESTS:    Personally reviewed.     Consultant(s) Notes Reviewed:  [x] YES  [ ] NO     Patient is a 82y old  Female who presents with a chief complaint of abdominal pain, nausea, vomiting.        INTERVAL HPI/OVERNIGHT EVENTS: Pt states she had 4 large loose BMs overnight after receiving laxatives and now feels better. Nausea is resolving. Abd pain is improving. No vomiting today. Tolerated clear liquid diet but only ate half her food and does not feel ready to advance to thicker food yet. Pt denies fever, chills, SOB, CP, cough, dysuria, flank pain.    MEDICATIONS  (STANDING):  atorvastatin 10 milliGRAM(s) Oral at bedtime  celecoxib 200 milliGRAM(s) Oral two times a day  enoxaparin Injectable 40 milliGRAM(s) SubCutaneous every 24 hours  levothyroxine 50 MICROGram(s) Oral <User Schedule>  levothyroxine 75 MICROGram(s) Oral <User Schedule>  pantoprazole    Tablet 40 milliGRAM(s) Oral daily    MEDICATIONS  (PRN):  acetaminophen     Tablet .. 650 milliGRAM(s) Oral every 6 hours PRN Temp greater or equal to 38C (100.4F), Mild Pain (1 - 3)  melatonin 3 milliGRAM(s) Oral at bedtime PRN Insomnia  ondansetron Injectable 4 milliGRAM(s) IV Push every 8 hours PRN Nausea and/or Vomiting      Allergies    latex (Hives)  No Known Drug Allergies    Intolerances        REVIEW OF SYSTEMS:  CONSTITUTIONAL: No fever or chills  HEENT:  No headache, no sore throat  RESPIRATORY: No cough, wheezing, or shortness of breath  CARDIOVASCULAR: No chest pain, palpitations  GASTROINTESTINAL: +abd pain (improving); nausea resolving, no vomiting; 4 loose BMs overnight after laxatives  GENITOURINARY: No dysuria, frequency, or hematuria  NEUROLOGICAL: no focal weakness or dizziness  MUSCULOSKELETAL: no myalgias     Vital Signs Last 24 Hrs  T(C): 36.8 (29 May 2023 12:11), Max: 37.8 (28 May 2023 23:35)  T(F): 98.3 (29 May 2023 12:11), Max: 100 (28 May 2023 23:35)  HR: 64 (29 May 2023 12:11) (56 - 65)  BP: 133/69 (29 May 2023 12:11) (133/69 - 155/74)  BP(mean): --  RR: 17 (29 May 2023 12:11) (16 - 20)  SpO2: 97% (29 May 2023 12:11) (96% - 99%)    Parameters below as of 29 May 2023 12:11  Patient On (Oxygen Delivery Method): room air        PHYSICAL EXAM:  GENERAL: NAD  HEENT:  anicteric, moist mucous membranes  CHEST/LUNG:  CTA b/l, no rales, wheezes, or rhonchi  HEART:  RRR, S1, S2  ABDOMEN:  BS+, soft, tenderness without guarding worse in LLQ, nondistended  EXTREMITIES: no cyanosis, or calf tenderness ; healing incision on anterior of left proximal thigh  NERVOUS SYSTEM: answers questions and follows commands appropriately    LABS:                        10.2   12.87 )-----------( Clumped    ( 29 May 2023 08:05 )             30.0     CBC Full  -  ( 29 May 2023 08:05 )  WBC Count : 12.87 K/uL  Hemoglobin : 10.2 g/dL  Hematocrit : 30.0 %  Platelet Count - Automated : Clumped K/uL  Mean Cell Volume : 90.4 fl  Mean Cell Hemoglobin : 30.7 pg  Mean Cell Hemoglobin Concentration : 34.0 gm/dL  Auto Neutrophil # : 11.16 K/uL  Auto Lymphocyte # : 0.99 K/uL  Auto Monocyte # : 0.66 K/uL  Auto Eosinophil # : 0.00 K/uL  Auto Basophil # : 0.01 K/uL  Auto Neutrophil % : 86.7 %  Auto Lymphocyte % : 7.7 %  Auto Monocyte % : 5.1 %  Auto Eosinophil % : 0.0 %  Auto Basophil % : 0.1 %    29 May 2023 08:05    139    |  107    |  8      ----------------------------<  139    3.5     |  23     |  0.49     Ca    8.1        29 May 2023 08:05  Phos  3.6       29 May 2023 08:05  Mg     3.0       29 May 2023 08:05    TPro  6.3    /  Alb  2.8    /  TBili  1.2    /  DBili  x      /  AST  23     /  ALT  24     /  AlkPhos  96     29 May 2023 08:05      Urinalysis Basic - ( 28 May 2023 19:20 )    Color: Yellow / Appearance: Clear / S.020 / pH: x  Gluc: x / Ketone: Large  / Bili: Negative / Urobili: 4   Blood: x / Protein: Negative / Nitrite: Negative   Leuk Esterase: Trace / RBC: 3-5 /HPF / WBC 3-5   Sq Epi: x / Non Sq Epi: x / Bacteria: Occasional      CAPILLARY BLOOD GLUCOSE              RADIOLOGY & ADDITIONAL TESTS:    Personally reviewed.     Consultant(s) Notes Reviewed:  [x] YES  [ ] NO

## 2023-05-29 NOTE — PHYSICAL THERAPY INITIAL EVALUATION ADULT - GAIT TRAINING, PT EVAL
Patient will ambulate 300 feet independently with rolling walker by 2 weeks to allow for increased independence in the community.

## 2023-05-30 ENCOUNTER — TRANSCRIPTION ENCOUNTER (OUTPATIENT)
Age: 83
End: 2023-05-30

## 2023-05-30 DIAGNOSIS — M16.11 UNILATERAL PRIMARY OSTEOARTHRITIS, RIGHT HIP: ICD-10-CM

## 2023-05-30 DIAGNOSIS — M17.11 UNILATERAL PRIMARY OSTEOARTHRITIS, RIGHT KNEE: ICD-10-CM

## 2023-05-30 LAB
ALBUMIN SERPL ELPH-MCNC: 2.4 G/DL — LOW (ref 3.3–5)
ALP SERPL-CCNC: 73 U/L — SIGNIFICANT CHANGE UP (ref 40–120)
ALT FLD-CCNC: 17 U/L — SIGNIFICANT CHANGE UP (ref 12–78)
ANION GAP SERPL CALC-SCNC: 5 MMOL/L — SIGNIFICANT CHANGE UP (ref 5–17)
AST SERPL-CCNC: 18 U/L — SIGNIFICANT CHANGE UP (ref 15–37)
BASOPHILS # BLD AUTO: 0.04 K/UL — SIGNIFICANT CHANGE UP (ref 0–0.2)
BASOPHILS NFR BLD AUTO: 0.4 % — SIGNIFICANT CHANGE UP (ref 0–2)
BILIRUB SERPL-MCNC: 0.8 MG/DL — SIGNIFICANT CHANGE UP (ref 0.2–1.2)
BUN SERPL-MCNC: 6 MG/DL — LOW (ref 7–23)
C DIFF BY PCR RESULT: SIGNIFICANT CHANGE UP
CALCIUM SERPL-MCNC: 8 MG/DL — LOW (ref 8.5–10.1)
CHLORIDE SERPL-SCNC: 109 MMOL/L — HIGH (ref 96–108)
CO2 SERPL-SCNC: 28 MMOL/L — SIGNIFICANT CHANGE UP (ref 22–31)
CREAT SERPL-MCNC: 0.45 MG/DL — LOW (ref 0.5–1.3)
EGFR: 95 ML/MIN/1.73M2 — SIGNIFICANT CHANGE UP
EOSINOPHIL # BLD AUTO: 0.17 K/UL — SIGNIFICANT CHANGE UP (ref 0–0.5)
EOSINOPHIL NFR BLD AUTO: 1.6 % — SIGNIFICANT CHANGE UP (ref 0–6)
GI PCR PANEL: SIGNIFICANT CHANGE UP
GLUCOSE SERPL-MCNC: 103 MG/DL — HIGH (ref 70–99)
HCT VFR BLD CALC: 27.2 % — LOW (ref 34.5–45)
HGB BLD-MCNC: 9.1 G/DL — LOW (ref 11.5–15.5)
IMM GRANULOCYTES NFR BLD AUTO: 0.5 % — SIGNIFICANT CHANGE UP (ref 0–0.9)
LYMPHOCYTES # BLD AUTO: 1.78 K/UL — SIGNIFICANT CHANGE UP (ref 1–3.3)
LYMPHOCYTES # BLD AUTO: 16.8 % — SIGNIFICANT CHANGE UP (ref 13–44)
MCHC RBC-ENTMCNC: 30.7 PG — SIGNIFICANT CHANGE UP (ref 27–34)
MCHC RBC-ENTMCNC: 33.5 GM/DL — SIGNIFICANT CHANGE UP (ref 32–36)
MCV RBC AUTO: 91.9 FL — SIGNIFICANT CHANGE UP (ref 80–100)
MONOCYTES # BLD AUTO: 0.85 K/UL — SIGNIFICANT CHANGE UP (ref 0–0.9)
MONOCYTES NFR BLD AUTO: 8 % — SIGNIFICANT CHANGE UP (ref 2–14)
NEUTROPHILS # BLD AUTO: 7.69 K/UL — HIGH (ref 1.8–7.4)
NEUTROPHILS NFR BLD AUTO: 72.7 % — SIGNIFICANT CHANGE UP (ref 43–77)
NRBC # BLD: 0 /100 WBCS — SIGNIFICANT CHANGE UP (ref 0–0)
PLATELET # BLD AUTO: 295 K/UL — SIGNIFICANT CHANGE UP (ref 150–400)
POTASSIUM SERPL-MCNC: 3.1 MMOL/L — LOW (ref 3.5–5.3)
POTASSIUM SERPL-SCNC: 3.1 MMOL/L — LOW (ref 3.5–5.3)
PROT SERPL-MCNC: 5.4 G/DL — LOW (ref 6–8.3)
RBC # BLD: 2.96 M/UL — LOW (ref 3.8–5.2)
RBC # FLD: 13.3 % — SIGNIFICANT CHANGE UP (ref 10.3–14.5)
SODIUM SERPL-SCNC: 142 MMOL/L — SIGNIFICANT CHANGE UP (ref 135–145)
WBC # BLD: 10.58 K/UL — HIGH (ref 3.8–10.5)
WBC # FLD AUTO: 10.58 K/UL — HIGH (ref 3.8–10.5)

## 2023-05-30 PROCEDURE — 83605 ASSAY OF LACTIC ACID: CPT

## 2023-05-30 PROCEDURE — 85025 COMPLETE CBC W/AUTO DIFF WBC: CPT

## 2023-05-30 PROCEDURE — 81001 URINALYSIS AUTO W/SCOPE: CPT

## 2023-05-30 PROCEDURE — 99239 HOSP IP/OBS DSCHRG MGMT >30: CPT

## 2023-05-30 PROCEDURE — 99285 EMERGENCY DEPT VISIT HI MDM: CPT | Mod: 25

## 2023-05-30 PROCEDURE — 36415 COLL VENOUS BLD VENIPUNCTURE: CPT

## 2023-05-30 PROCEDURE — 80053 COMPREHEN METABOLIC PANEL: CPT

## 2023-05-30 PROCEDURE — 71045 X-RAY EXAM CHEST 1 VIEW: CPT

## 2023-05-30 PROCEDURE — 97161 PT EVAL LOW COMPLEX 20 MIN: CPT

## 2023-05-30 PROCEDURE — 96374 THER/PROPH/DIAG INJ IV PUSH: CPT

## 2023-05-30 PROCEDURE — 96376 TX/PRO/DX INJ SAME DRUG ADON: CPT

## 2023-05-30 PROCEDURE — 99232 SBSQ HOSP IP/OBS MODERATE 35: CPT

## 2023-05-30 PROCEDURE — 93005 ELECTROCARDIOGRAM TRACING: CPT

## 2023-05-30 PROCEDURE — 84100 ASSAY OF PHOSPHORUS: CPT

## 2023-05-30 PROCEDURE — 83690 ASSAY OF LIPASE: CPT

## 2023-05-30 PROCEDURE — 96375 TX/PRO/DX INJ NEW DRUG ADDON: CPT

## 2023-05-30 PROCEDURE — 87507 IADNA-DNA/RNA PROBE TQ 12-25: CPT

## 2023-05-30 PROCEDURE — 87493 C DIFF AMPLIFIED PROBE: CPT

## 2023-05-30 PROCEDURE — 83735 ASSAY OF MAGNESIUM: CPT

## 2023-05-30 PROCEDURE — 74177 CT ABD & PELVIS W/CONTRAST: CPT | Mod: MA

## 2023-05-30 RX ORDER — POTASSIUM CHLORIDE 20 MEQ
40 PACKET (EA) ORAL EVERY 6 HOURS
Refills: 0 | Status: COMPLETED | OUTPATIENT
Start: 2023-05-30 | End: 2023-05-30

## 2023-05-30 RX ADMIN — Medication 650 MILLIGRAM(S): at 14:45

## 2023-05-30 RX ADMIN — CELECOXIB 200 MILLIGRAM(S): 200 CAPSULE ORAL at 05:17

## 2023-05-30 RX ADMIN — Medication 40 MILLIEQUIVALENT(S): at 13:56

## 2023-05-30 RX ADMIN — CELECOXIB 200 MILLIGRAM(S): 200 CAPSULE ORAL at 05:47

## 2023-05-30 RX ADMIN — PANTOPRAZOLE SODIUM 40 MILLIGRAM(S): 20 TABLET, DELAYED RELEASE ORAL at 11:45

## 2023-05-30 RX ADMIN — Medication 650 MILLIGRAM(S): at 13:57

## 2023-05-30 RX ADMIN — Medication 40 MILLIEQUIVALENT(S): at 10:31

## 2023-05-30 RX ADMIN — Medication 75 MICROGRAM(S): at 05:17

## 2023-05-30 NOTE — CARE COORDINATION ASSESSMENT. - NSPASTMEDSURGHISTORY_GEN_ALL_CORE_FT
PAST MEDICAL & SURGICAL HISTORY:  Osteoarthritis      Hypothyroid      S/P hip replacement, left      S/P cataract surgery      History of appendectomy

## 2023-05-30 NOTE — DISCHARGE NOTE PROVIDER - HOSPITAL COURSE
HPI as documented in H&P:  82y female with PMHx of hypothyroidism, osteoarthritis, s/p left hip replacement 5/24 at [facility] presented to the ED complaining of abdominal pain and constipation. States that her last BM was 5/24 at 3PM before her surgery. Took some oxycodone in the hospital on 5/24 and at 5/25 PM. Stopped taking it since because it was constipating her so much. She has been having abdominal pain on the day of admission. She called the nurse who advised to take miralax and then try milk of magnesia. It was unsuccessful, so she was told to try milk of magnesia again in a few hours. She took it and then became nauseous and started vomiting. She is having difficulty tolerating PO intake with associated abdominal pain and nausea. Pain is mostly in her lower abdomen, rated 7/10 in intensity. No urinary symptoms. Has not used any oxycodone at home and has been managing with tylenol. States that her hip is great and without pain. She is ambulating well. Denies chest pain, palpitations, dyspnea, headache, dizziness, or diarrhea.    In the ED,   Vital Signs TMax(F): 98.4 HR: 60 BP: 155/74 RR: 18 SpO2: 99%  Labs significant for: H/H 10.7/31.1, Glucose 132  CXR - no acute infiltrate on personal review  CT abd/pelvis with PO/IV contrast: No small bowel obstruction. Mild pancolitis with fluid suggesting diarrhea. Diverticulosis. Small hiatal hernia.  UA with trace LE, occasional bacteria, large ketones  EKG: Sinus bradycardia at 59bpm      Hospital Course:  Pt a/w pancolitis, n/v, constipation. Pt given bowel regimen and had 5 large BMs. ID (Cho) and GI (Sayedy/Castillo) consulted on the case and rec GI PCR and c diff PCR to r/out infectious colitis. Hx more consistent with severe constipation and bowel regimen leading to the CT findings. Stool studies were negative. Pt felt much improved after having BMs. Tolerated advancement of diet and leukocytosis improved. Pt felt very well and was discharged to home.    On day of discharge:  ROS: abd pain, n/v, diarrhea/ constipation resolved. Denies fever, chills, SOB, CP.   Vital Signs Last 24 Hrs  T(C): 36.9 (30 May 2023 12:51), Max: 36.9 (29 May 2023 21:51)  T(F): 98.5 (30 May 2023 12:51), Max: 98.5 (29 May 2023 21:51)  HR: 65 (30 May 2023 12:51) (65 - 74)  BP: 108/66 (30 May 2023 12:51) (108/66 - 126/76)  BP(mean): --  RR: 18 (30 May 2023 12:51) (17 - 18)  SpO2: 96% (30 May 2023 12:51) (96% - 96%)    Parameters below as of 30 May 2023 12:51  Patient On (Oxygen Delivery Method): room air    PHYSICAL EXAM:  GENERAL: NAD  HEENT:  anicteric, moist mucous membranes  CHEST/LUNG:  CTA b/l, no rales, wheezes, or rhonchi  HEART:  RRR, S1, S2  ABDOMEN:  BS+, soft, nontender, nondistended  EXTREMITIES: no cyanosis, or calf tenderness ; healing incision on anterior of left proximal thigh  NERVOUS SYSTEM: answers questions and follows commands appropriately    Time spent: 40min

## 2023-05-30 NOTE — PROGRESS NOTE ADULT - SUBJECTIVE AND OBJECTIVE BOX
[facility]  Daniel Barr PA-C  [address] [place] [address]  [phone]      INTERVAL HPI/OVERNIGHT EVENTS:  Pt s/e  Reports no new GI events s/p large BMs    MEDICATIONS  (STANDING):  atorvastatin 10 milliGRAM(s) Oral at bedtime  celecoxib 200 milliGRAM(s) Oral two times a day  enoxaparin Injectable 40 milliGRAM(s) SubCutaneous every 24 hours  levothyroxine 50 MICROGram(s) Oral <User Schedule>  levothyroxine 75 MICROGram(s) Oral <User Schedule>  pantoprazole    Tablet 40 milliGRAM(s) Oral daily  potassium chloride    Tablet ER 40 milliEquivalent(s) Oral every 6 hours    MEDICATIONS  (PRN):  acetaminophen     Tablet .. 650 milliGRAM(s) Oral every 6 hours PRN Temp greater or equal to 38C (100.4F), Mild Pain (1 - 3)  melatonin 3 milliGRAM(s) Oral at bedtime PRN Insomnia  ondansetron Injectable 4 milliGRAM(s) IV Push every 8 hours PRN Nausea and/or Vomiting      Allergies    latex (Hives)  No Known Drug Allergies      PHYSICAL EXAM:   Vital Signs:  Vital Signs Last 24 Hrs  T(C): 36.7 (30 May 2023 05:00), Max: 36.9 (29 May 2023 21:51)  T(F): 98.1 (30 May 2023 05:00), Max: 98.5 (29 May 2023 21:51)  HR: 74 (30 May 2023 05:00) (64 - 74)  BP: 126/76 (30 May 2023 05:00) (111/62 - 133/69)  BP(mean): --  RR: 18 (30 May 2023 05:00) (17 - 18)  SpO2: 96% (30 May 2023 05:00) (96% - 97%)    Parameters below as of 30 May 2023 05:00  Patient On (Oxygen Delivery Method): room air      Daily     Daily Weight in k.7 (30 May 2023 05:00)    GENERAL:  Appears stated age  HEENT:  NC/AT  CHEST:  Full & symmetric excursion  HEART:  Regular rhythm  ABDOMEN:  Soft, non-tender, non-distended  EXTEREMITIES:  no cyanosis  SKIN:  No rash  NEURO:  Alert      LABS:                        9.1    10.58 )-----------( 295      ( 30 May 2023 06:35 )             27.2     05-    142  |  109<H>  |  6<L>  ----------------------------<  103<H>  3.1<L>   |  28  |  0.45<L>    Ca    8.0<L>      30 May 2023 06:35  Phos  3.6       Mg     3.0         TPro  5.4<L>  /  Alb  2.4<L>  /  TBili  0.8  /  DBili  x   /  AST  18  /  ALT  17  /  AlkPhos  73        Urinalysis Basic - ( 28 May 2023 19:20 )    Color: Yellow / Appearance: Clear / S.020 / pH: x  Gluc: x / Ketone: Large  / Bili: Negative / Urobili: 4   Blood: x / Protein: Negative / Nitrite: Negative   Leuk Esterase: Trace / RBC: 3-5 /HPF / WBC 3-5   Sq Epi: x / Non Sq Epi: x / Bacteria: Occasional

## 2023-05-30 NOTE — CARE COORDINATION ASSESSMENT. - OTHER PERTINENT DISCHARGE PLANNING INFORMATION:
Met with patient at bedside. Role of CM/discharge planning explained. Patient verbalizes understanding. Transition planning information provided to patient at this time. Patient lives in private house w/ , 2 ELLA, 0 inside. Pt is independent w/ ADL's and ambulates w/ RW. Pt had Lt hip replaced on 5/24 and had [facility] for VN/PT PTA. Will resume [facility] as PTA. Pt states she has all DME including raised toilet and RW.  will transport home. Anticipate DC today w/ MANJU w [facility]. CM contact information provided. CM will continue to follow. Referral sent to [facility] for [facility].

## 2023-05-30 NOTE — CARE COORDINATION ASSESSMENT. - NSCAREPROVIDERS_GEN_ALL_CORE_FT
CARE PROVIDERS:  Accepting Physician: Pancho Elliott  Administration: Blayne Goldstein  Administration: Ze Díaz  Admitting: Pancho Elliott  Attending: Vega Rosas  Case Management: Moinca Amin  Consultant: Nguyen Barr  Consultant: Malu Sosa  Covering Team: Pancho Elliott  ED Attending: Ashley [address] ED Nurse: Gayathri Mccauley  Infection Control: Estefania Sanchez  Nurse: Shruti Capps  Ordered: ADM, User  Ordered: Physician, Ordering  Override: Maria A Kaiser  Override: Quirino Boyd  Override: Marina Modi  Override: Edith Alonzo  PCA/Nursing Assistant: Quirino Boyd  PCA/Nursing Assistant: Marina Modi  Primary Team: Vega Rosas  Registered Dietitian: Yudelka Russell// Supp. Assoc.: Meghna Hernandes

## 2023-05-30 NOTE — DISCHARGE NOTE NURSING/CASE MANAGEMENT/SOCIAL WORK - PATIENT PORTAL LINK FT
You can access the FollowMyHealth Patient Portal offered by [facility] by registering at the following website: [email or web address]. By joining [employer]’s FollowMyHealth portal, you will also be able to view your health information using other applications (apps) compatible with our system.

## 2023-05-30 NOTE — DISCHARGE NOTE PROVIDER - NSDCCPCAREPLAN_GEN_ALL_CORE_FT
PRINCIPAL DISCHARGE DIAGNOSIS  Diagnosis: Colitis  Assessment and Plan of Treatment: You had mild colitis (inflammation of the colon) on the admission CT likely related to the severe constipation you had at home and the bowel regimen you were taking. You had stool studies sent which were negative for sign of infection. You greatly improved with evacuation of the retained stool after bowel regimen.   Please maintain a regular bowel movement regimen. If you are not having a bowel movement in >2 days take further laxative from the pharmacy like miralax or dulcolax. You may continue your senna for now.  If you develop fever, chills, or recurrence of your GI symptoms, call your doctor immediately or return to the ER.  Follow up with your PCP in a week.      SECONDARY DISCHARGE DIAGNOSES  Diagnosis: S/P hip replacement, left  Assessment and Plan of Treatment: Follow up with your orthopedic surgeon per scheduled f/up appointment. Continue your aspirin 81mg twice a day in the meantime as you had been instructed before.

## 2023-05-30 NOTE — PROGRESS NOTE ADULT - ASSESSMENT
83yo F with PMHx of hypothyroidism, osteoarthritis, s/p recent elective left hip replacement (5/24 at [facility]) p/w n/v, abd pain, and admitted with pancolitis. 
Abdominal pain  Constipation  S/p hip replacement    CT noted  S/p large BMs  Clinically improved  Reg diet as tolerated  If tolerating diet no GI objection to d/c  Bowel regimen at home with miralax    I reviewed the overnight course of events on the unit, re-confirming the patient history. I discussed the care with the patient and their family  Differential diagnosis and plan of care discussed with patient after the evaluation  40 minutes spent on total encounter of which more than fifty percent of the encounter was spent counseling and/or coordinating care by the attending physician.  Advanced care planning was discussed with patient and family.  Advanced care planning forms were reviewed and discussed.  Risks, benefits and alternatives of gastroenterologic procedures were discussed in detail and all questions were answered.  
82y female with PMHx of hypothyroidism, osteoarthritis, s/p left hip replacement 5/24 at [facility], who presented with abdominal pain and constipation. Found to have evidence of mild pancolitis on CT--suspect her symptoms are more related to recent use of narcotics and laxatives rather than infection.     C diff and GI PCR are negative. Patient reports feeling better off antibiotics. She has no fever and mild leukocytosis also improved.    -can discontinue isolation  -will sign off, please call ID if any further questions. Thank you.    Malu Sosa MD  Division of Infectious Diseases   Cell [phone] between 8am and 6pm   After 6pm and weekends please call ID service at [phone].

## 2023-05-30 NOTE — PROGRESS NOTE ADULT - SUBJECTIVE AND OBJECTIVE BOX
[facility]  INFECTIOUS DISEASES - [address], [place] [address]  Tel: [phone]     Fax: [phone]  =======================================================    DAMION HOWELL [id number]    Follow up: No fevers. Seen earlier today. Feels better, denies any abdominal pain. Denies any L hip pain.    Allergies:  latex (Hives)  No Known Drug Allergies      Antibiotics:  acetaminophen     Tablet .. 650 milliGRAM(s) Oral every 6 hours PRN  atorvastatin 10 milliGRAM(s) Oral at bedtime  celecoxib 200 milliGRAM(s) Oral two times a day  enoxaparin Injectable 40 milliGRAM(s) SubCutaneous every 24 hours  levothyroxine 50 MICROGram(s) Oral <User Schedule>  levothyroxine 75 MICROGram(s) Oral <User Schedule>  melatonin 3 milliGRAM(s) Oral at bedtime PRN  ondansetron Injectable 4 milliGRAM(s) IV Push every 8 hours PRN  pantoprazole    Tablet 40 milliGRAM(s) Oral daily       REVIEW OF SYSTEMS:  CONSTITUTIONAL:  No Fever or chills  HEENT:  No sore throat or runny nose.  CARDIOVASCULAR:  No chest pain or SOB.  RESPIRATORY:  No cough, shortness of breath  GASTROINTESTINAL:  see history  GENITOURINARY:  No dysuria, frequency or urgency  MUSCULOSKELETAL:  no back pain  SKIN:  No change in skin, hair or nails.  NEUROLOGIC:  No headache or dizziness  PSYCHIATRIC:  No disorder of thought or mood.     Physical Exam:  ICU Vital Signs Last 24 Hrs  T(C): 36.9 (30 May 2023 12:51), Max: 36.9 (30 May 2023 12:51)  T(F): 98.5 (30 May 2023 12:51), Max: 98.5 (30 May 2023 12:51)  HR: 65 (30 May 2023 12:51) (65 - 74)  BP: 108/66 (30 May 2023 12:51) (108/66 - 126/76)  BP(mean): --  ABP: --  ABP(mean): --  RR: 18 (30 May 2023 12:51) (18 - 18)  SpO2: 96% (30 May 2023 12:51) (96% - 96%)    O2 Parameters below as of 30 May 2023 12:51  Patient On (Oxygen Delivery Method): room air      GEN: NAD  HEENT: normocephalic and atraumatic.   NECK: Supple.   LUNGS: Normal respiratory effprt  HEART: Regular rate and rhythm   ABDOMEN: Soft, nontender, and nondistended.    EXTREMITIES: Mild edema around L hip dressing; No lower leg edema.  NEUROLOGIC: grossly intact.  PSYCHIATRIC: Appropriate affect .    Labs:  05-30    142  |  109<H>  |  6<L>  ----------------------------<  103<H>  3.1<L>   |  28  |  0.45<L>    Ca    8.0<L>      30 May 2023 06:35  Phos  3.6     05-29  Mg     3.0     05-29    TPro  5.4<L>  /  Alb  2.4<L>  /  TBili  0.8  /  DBili  x   /  AST  18  /  ALT  17  /  AlkPhos  73  05-30                          9.1    10.58 )-----------( 295      ( 30 May 2023 06:35 )             27.2         LIVER FUNCTIONS - ( 30 May 2023 06:35 )  Alb: 2.4 g/dL / Pro: 5.4 g/dL / ALK PHOS: 73 U/L / ALT: 17 U/L / AST: 18 U/L / GGT: x             RECENT CULTURES:        All imaging and data are reviewed.

## 2023-05-30 NOTE — PATIENT CHOICE NOTE. - NSPTCHOICESTATE_GEN_ALL_CORE

## 2023-05-30 NOTE — DISCHARGE NOTE NURSING/CASE MANAGEMENT/SOCIAL WORK - NSDCPEFALRISK_GEN_ALL_CORE
For information on Fall & Injury Prevention, visit: [email or web address] OR  [email or web address] OR  https://www.cdc.gov/steadi/patient.html

## 2023-05-30 NOTE — CARE COORDINATION ASSESSMENT. - PATIENT'S REACTION TO HEALTH STATUS
Patient is adjusting to hip replacement and rehab services at home. Patient is very positive and states she feels great/accepting/adjusting

## 2023-05-30 NOTE — DISCHARGE NOTE PROVIDER - CARE PROVIDER_API CALL
Parveen Saavedra  Internal Medicine  [address] [place] [address]  Phone: [phone]  Fax: [phone]  Follow Up Time:

## 2023-05-31 VITALS
RESPIRATION RATE: 18 BRPM | OXYGEN SATURATION: 93 % | TEMPERATURE: 99 F | SYSTOLIC BLOOD PRESSURE: 123 MMHG | DIASTOLIC BLOOD PRESSURE: 71 MMHG | HEART RATE: 93 BPM

## 2023-06-01 ENCOUNTER — TRANSCRIPTION ENCOUNTER (OUTPATIENT)
Age: 83
End: 2023-06-01

## 2023-06-05 NOTE — DISCHARGE NOTE PROVIDER - NSDCMRMEDTOKEN_GEN_ALL_CORE_FT
[Negative] : Heme/Lymph
Aspirin Enteric Coated 81 mg oral delayed release tablet: 1 tab(s) orally 2 times a day post-op DVT prophylaxis  CeleBREX 200 mg oral capsule: 1 cap(s) orally 2 times a day to prevent heterotropic ossification  lovastatin 20 mg oral tablet: 1 tab(s) orally once a day  omeprazole 20 mg oral delayed release capsule: 1 cap(s) orally once a day  Senna S 50 mg-8.6 mg oral tablet: 2 tab(s) orally once a day (at bedtime)  Synthroid 50 mcg (0.05 mg) oral tablet: 1 tab(s) orally Monday, Wednesday, and Friday  Synthroid 75 mcg (0.075 mg) oral tablet: 1 tab(s) orally 4 times a week Tu/Th/Sa/Sun

## 2023-06-07 ENCOUNTER — TRANSCRIPTION ENCOUNTER (OUTPATIENT)
Age: 83
End: 2023-06-07

## 2023-06-22 ENCOUNTER — APPOINTMENT (OUTPATIENT)
Dept: ORTHOPEDIC SURGERY | Facility: CLINIC | Age: 83
End: 2023-06-22
Payer: MEDICARE

## 2023-06-22 VITALS
BODY MASS INDEX: 28.66 KG/M2 | DIASTOLIC BLOOD PRESSURE: 74 MMHG | SYSTOLIC BLOOD PRESSURE: 130 MMHG | WEIGHT: 172 LBS | HEIGHT: 65 IN

## 2023-06-22 DIAGNOSIS — Z47.1 AFTERCARE FOLLOWING JOINT REPLACEMENT SURGERY: ICD-10-CM

## 2023-06-22 DIAGNOSIS — Z96.659 PRESENCE OF UNSPECIFIED ARTIFICIAL KNEE JOINT: ICD-10-CM

## 2023-06-22 DIAGNOSIS — Z96.642 PRESENCE OF LEFT ARTIFICIAL HIP JOINT: ICD-10-CM

## 2023-06-22 PROCEDURE — 73502 X-RAY EXAM HIP UNI 2-3 VIEWS: CPT

## 2023-06-22 PROCEDURE — 99024 POSTOP FOLLOW-UP VISIT: CPT

## 2023-06-22 NOTE — HISTORY OF PRESENT ILLNESS
[de-identified] : S/P Left robotic assisted anterior THR with SHAAN, DOS: 5/24/23. [de-identified] : She is doing well s/p left total hip replacement. She completed in home physical therapy and is due to begin outpatient PT soon. She  is taking  Celebrex and Tylenol for pain and pain level is controlled. She  is taking aspirin for DVT ppx. Patient denies any fevers chills or constitutional symptoms. Patient denies any falls or Trauma. Overall patient is doing well.  [de-identified] : Exam of the left hip and contralateral iliac crest shows well healing incisions, hip flexion of 100 degrees, hip external rotation of 45 degrees. Patient can perform a straight leg raise.		5/5 motor strength bilaterally distally. Sensation intact distally.		  [de-identified] :  X-ray: AP of the pelvis and 2 views of the left hip demonstrate a left total hip arthroplasty in stable position, with no evidence of fracture, loosening, or dislocation.		  [de-identified] : The patient is doing very well 3 weeks after left anterior total hip replacement. The patient will be transitioned to outpatient physical therapy and a prescription was given for that. The patient will take aspirin 81 mg twice per day for DVT prophylaxis for the next three weeks. Anterior hip precautions were reinforced. Overall the patient is very happy with their outcome so far. Followup in 3 weeks with repeat x-rays.

## 2023-06-22 NOTE — ADDENDUM
[FreeTextEntry1] : This note was authored by Slick Anderson working as a medical scribe for Dr. Aaron Coulter. The note was reviewed, edited, and revised by Dr. Aaron Coulter whom is in agreement with the exam findings, imaging findings, and treatment plan. 06/22/2023

## 2023-06-23 ENCOUNTER — TRANSCRIPTION ENCOUNTER (OUTPATIENT)
Age: 83
End: 2023-06-23

## 2023-07-07 ENCOUNTER — APPOINTMENT (OUTPATIENT)
Dept: ORTHOPEDIC SURGERY | Facility: CLINIC | Age: 83
End: 2023-07-07
Payer: MEDICARE

## 2023-07-07 VITALS — HEIGHT: 65 IN | BODY MASS INDEX: 28.66 KG/M2 | WEIGHT: 172 LBS

## 2023-07-07 PROCEDURE — 99024 POSTOP FOLLOW-UP VISIT: CPT

## 2023-07-07 PROCEDURE — 73502 X-RAY EXAM HIP UNI 2-3 VIEWS: CPT

## 2023-07-07 NOTE — ADDENDUM
[FreeTextEntry1] : This note was authored by Slick Anderson working as a medical scribe for Dr. Aaron Coulter. The note was reviewed, edited, and revised by Dr. Aaron Coulter whom is in agreement with the exam findings, imaging findings, and treatment plan. 07/07/2023

## 2023-07-07 NOTE — HISTORY OF PRESENT ILLNESS
[de-identified] : S/P Left robotic assisted anterior THR with SHAAN, DOS: 5/24/23.  [de-identified] : DAMION HOWELL is a 82 year female 6 weeks s/p left THR.  She  is ambulating and transferring well without assistive devices. She  reports continuing outpatient physical therapy with good improvement of strength. She continues aspirin for DVT prophylaxis. She  has returned to daily activities of life without significant pain or discomfort. Overall, she is very happy with the results of the surgery.		  [de-identified] : Exam of the left hip shows a well healed incision, hip flexion of 100 degrees, hip external rotation of 45 degrees, hip internal rotation of 20 degrees. Patient can perform a straight leg raise. Without assistive devices she ambulates without a limp. 5/5 motor strength bilaterally distally. Sensation intact distally.		  [de-identified] :  X-ray: AP of the pelvis and 2 views of the left hip demonstrate a left total hip arthroplasty in stable position, with no evidence of fracture, loosening, or dislocation.		  [de-identified] : The patient is doing very well 6 weeks following anterior left total hip replacement. Anterior hip precautions were reviewed and recommended to be followed for another 6 weeks. The patient will continue outpatient physical therapy and gradually transition to a home exercise program over the next 6 weeks. The patient may stop taking aspirin at this point or resume preoperative aspirin dosing if applicable. Dental prophylaxis was reviewed. The patient is doing very well so far and overall very happy with their progress. Follow up in 6 weeks.

## 2023-07-11 ENCOUNTER — TRANSCRIPTION ENCOUNTER (OUTPATIENT)
Age: 83
End: 2023-07-11

## 2023-08-22 ENCOUNTER — TRANSCRIPTION ENCOUNTER (OUTPATIENT)
Age: 83
End: 2023-08-22

## 2023-09-13 RX ORDER — UBIDECARENONE 100 MG
1 CAPSULE ORAL
Refills: 0 | DISCHARGE

## 2023-09-13 RX ORDER — GINKGO BILOBA 40 MG
1 CAPSULE ORAL
Refills: 0 | DISCHARGE

## 2023-09-13 RX ORDER — CELECOXIB 200 MG/1
1 CAPSULE ORAL
Refills: 0 | DISCHARGE

## 2023-09-13 RX ORDER — ASPIRIN/CALCIUM CARB/MAGNESIUM 324 MG
1 TABLET ORAL
Refills: 0 | DISCHARGE

## 2023-09-13 RX ORDER — LEVOTHYROXINE SODIUM 125 MCG
1 TABLET ORAL
Refills: 0 | DISCHARGE

## 2023-09-13 RX ORDER — MELOXICAM 15 MG/1
1 TABLET ORAL
Refills: 0 | DISCHARGE

## 2023-09-13 RX ORDER — OMEPRAZOLE 10 MG/1
1 CAPSULE, DELAYED RELEASE ORAL
Refills: 0 | DISCHARGE

## 2023-09-13 RX ORDER — LOVASTATIN 20 MG
1 TABLET ORAL
Refills: 0 | DISCHARGE

## 2023-09-13 RX ORDER — SENNOSIDES/DOCUSATE SODIUM 8.6MG-50MG
2 TABLET ORAL
Refills: 0 | DISCHARGE

## 2024-12-25 PROBLEM — F10.90 ALCOHOL USE: Status: ACTIVE | Noted: 2023-04-27

## 2025-05-11 NOTE — DISCHARGE NOTE PROVIDER - YES NO FOR MLM POSITIVE OR NEGATIVE COVID RESULT
SUBJECTIVE: Patient seen and examined bedside. Patient refers ___ this morning. Patient reports/denies passing gas, having bowel movement, nausea, vomiting, CP, SOB.    vancomycin    Solution 125 milliGRAM(s) Oral every 6 hours    MEDICATIONS  (PRN):  acetaminophen     Tablet .. 650 milliGRAM(s) Oral every 6 hours PRN Temp greater or equal to 38C (100.4F), Mild Pain (1 - 3)  ALPRAZolam 0.125 milliGRAM(s) Oral at bedtime PRN insomnia  aluminum hydroxide/magnesium hydroxide/simethicone Suspension 30 milliLiter(s) Oral every 4 hours PRN Dyspepsia  melatonin 3 milliGRAM(s) Oral at bedtime PRN Insomnia  ondansetron Injectable 4 milliGRAM(s) IV Push every 8 hours PRN Nausea and/or Vomiting  simethicone 80 milliGRAM(s) Chew four times a day PRN Gas      I&O's Detail      Vital Signs Last 24 Hrs  T(C): 36.3 (11 May 2025 15:39), Max: 36.7 (10 May 2025 22:05)  T(F): 97.3 (11 May 2025 15:39), Max: 98.1 (10 May 2025 22:05)  HR: 84 (11 May 2025 15:39) (71 - 92)  BP: 149/68 (11 May 2025 15:39) (135/72 - 149/68)  BP(mean): --  RR: 18 (11 May 2025 15:39) (17 - 18)  SpO2: 97% (11 May 2025 15:39) (96% - 98%)    Parameters below as of 11 May 2025 15:39  Patient On (Oxygen Delivery Method): room air        PHYSICAL EXAM:   Gen: NAD, resting comfortably   CV: NSR  Pulm: no respiratory distress on RA, CTAB   Abd: Soft, ND, NTTP, no rebound or guarding   Ext: WWP, no edema   Neuro: motor/sensory grossly intact     LABS:                        11.1   9.37  )-----------( 179      ( 11 May 2025 05:30 )             33.6     05-11    138  |  104  |  6[L]  ----------------------------<  110[H]  3.2[L]   |  25  |  0.66    Ca    8.8      11 May 2025 05:30  Phos  2.3     05-11  Mg     2.0     05-11    TPro  5.6[L]  /  Alb  3.5  /  TBili  0.4  /  DBili  x   /  AST  18  /  ALT  14  /  AlkPhos  65  05-11    LIVER FUNCTIONS - ( 11 May 2025 05:30 )  Alb: 3.5 g/dL / Pro: 5.6 g/dL / ALK PHOS: 65 U/L / ALT: 14 U/L / AST: 18 U/L / GGT: x             CAPILLARY BLOOD GLUCOSE      POCT Blood Glucose.: 98 mg/dL (11 May 2025 06:02)  POCT Blood Glucose.: 114 mg/dL (10 May 2025 23:56)  POCT Blood Glucose.: 105 mg/dL (10 May 2025 17:54)        Urinalysis Basic - ( 11 May 2025 05:30 )    Color: x / Appearance: x / SG: x / pH: x  Gluc: 110 mg/dL / Ketone: x  / Bili: x / Urobili: x   Blood: x / Protein: x / Nitrite: x   Leuk Esterase: x / RBC: x / WBC x   Sq Epi: x / Non Sq Epi: x / Bacteria: x     SUBJECTIVE: Patient seen and examined bedside. Patient refers feeling better this morning. Patient reports passing gas, and having less having bowel movements, denies nausea, vomiting, CP, SOB.    vancomycin    Solution 125 milliGRAM(s) Oral every 6 hours    MEDICATIONS  (PRN):  acetaminophen     Tablet .. 650 milliGRAM(s) Oral every 6 hours PRN Temp greater or equal to 38C (100.4F), Mild Pain (1 - 3)  ALPRAZolam 0.125 milliGRAM(s) Oral at bedtime PRN insomnia  aluminum hydroxide/magnesium hydroxide/simethicone Suspension 30 milliLiter(s) Oral every 4 hours PRN Dyspepsia  melatonin 3 milliGRAM(s) Oral at bedtime PRN Insomnia  ondansetron Injectable 4 milliGRAM(s) IV Push every 8 hours PRN Nausea and/or Vomiting  simethicone 80 milliGRAM(s) Chew four times a day PRN Gas      I&O's Detail      Vital Signs Last 24 Hrs  T(C): 36.3 (11 May 2025 15:39), Max: 36.7 (10 May 2025 22:05)  T(F): 97.3 (11 May 2025 15:39), Max: 98.1 (10 May 2025 22:05)  HR: 84 (11 May 2025 15:39) (71 - 92)  BP: 149/68 (11 May 2025 15:39) (135/72 - 149/68)  BP(mean): --  RR: 18 (11 May 2025 15:39) (17 - 18)  SpO2: 97% (11 May 2025 15:39) (96% - 98%)    Parameters below as of 11 May 2025 15:39  Patient On (Oxygen Delivery Method): room air        PHYSICAL EXAM:   Gen: NAD, resting comfortably   CV: NSR  Pulm: no respiratory distress on RA, CTAB   Abd: Soft, ND, NTTP, no rebound or guarding   Ext: WWP, no edema   Neuro: motor/sensory grossly intact     LABS:                        11.1   9.37  )-----------( 179      ( 11 May 2025 05:30 )             33.6     05-11    138  |  104  |  6[L]  ----------------------------<  110[H]  3.2[L]   |  25  |  0.66    Ca    8.8      11 May 2025 05:30  Phos  2.3     05-11  Mg     2.0     05-11    TPro  5.6[L]  /  Alb  3.5  /  TBili  0.4  /  DBili  x   /  AST  18  /  ALT  14  /  AlkPhos  65  05-11    LIVER FUNCTIONS - ( 11 May 2025 05:30 )  Alb: 3.5 g/dL / Pro: 5.6 g/dL / ALK PHOS: 65 U/L / ALT: 14 U/L / AST: 18 U/L / GGT: x             CAPILLARY BLOOD GLUCOSE      POCT Blood Glucose.: 98 mg/dL (11 May 2025 06:02)  POCT Blood Glucose.: 114 mg/dL (10 May 2025 23:56)  POCT Blood Glucose.: 105 mg/dL (10 May 2025 17:54)        Urinalysis Basic - ( 11 May 2025 05:30 )    Color: x / Appearance: x / SG: x / pH: x  Gluc: 110 mg/dL / Ketone: x  / Bili: x / Urobili: x   Blood: x / Protein: x / Nitrite: x   Leuk Esterase: x / RBC: x / WBC x   Sq Epi: x / Non Sq Epi: x / Bacteria: x     ,

## 2025-06-11 ENCOUNTER — APPOINTMENT (OUTPATIENT)
Dept: ORTHOPEDIC SURGERY | Facility: CLINIC | Age: 85
End: 2025-06-11

## 2025-06-11 VITALS
WEIGHT: 172 LBS | BODY MASS INDEX: 28.66 KG/M2 | HEIGHT: 65 IN | DIASTOLIC BLOOD PRESSURE: 76 MMHG | SYSTOLIC BLOOD PRESSURE: 152 MMHG

## 2025-06-11 PROCEDURE — 99212 OFFICE O/P EST SF 10 MIN: CPT | Mod: 25

## 2025-06-11 PROCEDURE — 73502 X-RAY EXAM HIP UNI 2-3 VIEWS: CPT | Mod: RT
